# Patient Record
Sex: FEMALE | Race: WHITE
[De-identification: names, ages, dates, MRNs, and addresses within clinical notes are randomized per-mention and may not be internally consistent; named-entity substitution may affect disease eponyms.]

---

## 2020-08-24 ENCOUNTER — HOSPITAL ENCOUNTER (INPATIENT)
Dept: HOSPITAL 97 - 5TH | Age: 85
LOS: 11 days | Discharge: HOME | DRG: 554 | End: 2020-09-04
Attending: PSYCHIATRY & NEUROLOGY | Admitting: PSYCHIATRY & NEUROLOGY
Payer: COMMERCIAL

## 2020-08-24 VITALS — BODY MASS INDEX: 23.2 KG/M2

## 2020-08-24 DIAGNOSIS — M81.0: Primary | ICD-10-CM

## 2020-08-24 DIAGNOSIS — G89.29: ICD-10-CM

## 2020-08-24 DIAGNOSIS — R64: ICD-10-CM

## 2020-08-24 DIAGNOSIS — Z79.899: ICD-10-CM

## 2020-08-24 DIAGNOSIS — Z79.890: ICD-10-CM

## 2020-08-24 DIAGNOSIS — M19.90: ICD-10-CM

## 2020-08-24 DIAGNOSIS — M54.41: ICD-10-CM

## 2020-08-24 DIAGNOSIS — Z20.828: ICD-10-CM

## 2020-08-24 DIAGNOSIS — R53.1: ICD-10-CM

## 2020-08-24 LAB — UA COMPLETE W REFLEX CULTURE PNL UR: (no result)

## 2020-08-24 PROCEDURE — 97162 PT EVAL MOD COMPLEX 30 MIN: CPT

## 2020-08-24 PROCEDURE — 97116 GAIT TRAINING THERAPY: CPT

## 2020-08-24 PROCEDURE — 92523 SPEECH SOUND LANG COMPREHEN: CPT

## 2020-08-24 PROCEDURE — 83735 ASSAY OF MAGNESIUM: CPT

## 2020-08-24 PROCEDURE — 97110 THERAPEUTIC EXERCISES: CPT

## 2020-08-24 PROCEDURE — 80048 BASIC METABOLIC PNL TOTAL CA: CPT

## 2020-08-24 PROCEDURE — 97127: CPT

## 2020-08-24 PROCEDURE — 87086 URINE CULTURE/COLONY COUNT: CPT

## 2020-08-24 PROCEDURE — 97530 THERAPEUTIC ACTIVITIES: CPT

## 2020-08-24 PROCEDURE — 82040 ASSAY OF SERUM ALBUMIN: CPT

## 2020-08-24 PROCEDURE — 36415 COLL VENOUS BLD VENIPUNCTURE: CPT

## 2020-08-24 PROCEDURE — 87088 URINE BACTERIA CULTURE: CPT

## 2020-08-24 PROCEDURE — 84134 ASSAY OF PREALBUMIN: CPT

## 2020-08-24 PROCEDURE — 81001 URINALYSIS AUTO W/SCOPE: CPT

## 2020-08-24 PROCEDURE — 85025 COMPLETE CBC W/AUTO DIFF WBC: CPT

## 2020-08-24 RX ADMIN — SENNOSIDES AND DOCUSATE SODIUM PRN TAB: 8.6; 5 TABLET ORAL at 20:28

## 2020-08-24 RX ADMIN — MELATONIN PRN MG: 3 TAB ORAL at 20:28

## 2020-08-24 RX ADMIN — APIXABAN SCH MG: 2.5 TABLET, FILM COATED ORAL at 20:27

## 2020-08-24 NOTE — R.PREADM
PRE-ADMISSION SCREENING FORM



SCREENING DATE AND TIME



2020 09:38 (CDT) 



ANTICIPATED REHAB ADMISSION DATE



2020 



REFERRING FACILITY



[facility] 



REFERRAL DATE AND TIME



2020 09:38 (CDT) 



REFERRAL OFFICE PHONE



[phone] 



ACUTE ADMIT DATE



2020 





Previous Rehabilitation(s):





No.



REFERRING PHYSICIAN



ADOLFO LEONARD 



REHAB FACILITY



[facility] 



CLINICAL LIAISON



Jenniffer Leal 



PHYSICIAN REVIEWER



Dr. Edgar Dick M.D. 



MR#



[id number] 



[facility]



[id number] 



NAME



HENRIETTA MATIAS



ADDRESS



[address] [place] [address], [address] 



[facility] 



PHONE



[phone] 



[facility]



[id number] 



DATE OF BIRTH



1932 



AGE



88 



SSN#



[id number] 



GENDER



female 



MARITAL STATUS



 



RACE



unknown race 



ADMIT FROM



01 - Home (private home/apt. board/care, assisted living, group home, transitional living) 



PRE-HOSPITAL LIVING SETTING



01 - Home (private home/apt. board/care, assisted living, group home, transitional living) 



HOME TYPE AND DETAILS



Type of home: single family house

# of levels in the residence: 1

# of steps within the residence: 0

# of steps to enter the residence: 0



PRE-HOSPITAL LIVING WITH



Alone 



FAMILY SUPPORT



Yes 



PRIMARY FAMILY CONTACT NAME



CHICO MATIAS 



PRIMARY FAMILY CONTACT PHONE



[phone] 



PRIMARY FAMILY CONTACT RELATIONSHIP



Son 



PHONE PRIMARY FAMILY CONTACT ON ADM.?



no 



IS PRIMARY FAMILY CONTACT AUTH. REP.?



no 



1ST EMERGENCY CONTACT



CHICO MATIAS 



1ST CONTACT PHONE



[phone] 



1ST CONTACT RELATIONSHIP



Son 



PHONE 1ST CONTACT ON ADM.



no 



IS 1ST CONTACT AUTH. REP.?



no 



PHONE 2ND CONTACT ON ADM.?



no 



PATIENT EMPLOYMENT STATUS



Retired (for age) 



PATIENT EMPLOYER



No Employer 



PAYOR INFORMATION:



1ST PAYOR NAME



[employer] 



1ST PAYOR PHONE



[phone] 



1ST PAYOR POLICY ID



[id number] 



INJURY/ILLNESS DUE TO ACCIDENT?



No 



ANOTHER PARTY RESPONSIBLE?



No 



PRIMARY REHAB/ACUTE DIAGNOSIS:



OSTEOPEROSIS



ONSET DATE



2020



REHAB IMPAIRMENT CATEGORY (RICHARDSON):



20 Miscellaneous (Misc) does NOT meet 60% rule



PRIMARY DIAGNOSIS-RELATED SURGERIES:



No surgeries related to the primary diagnosis were performed.



SUMMARY OF ACUTE HOSPITALIZATION:



Pt. is a 87 yo Right-handed female of unknown race.

On 2020 she was admitted to [facility] with diagnosis OSTEOPEROSIS.

Her impairment category is Debility 16 -  Debility (16).

Pre-morbidly, Pt. was independent/mod-I in Safety Awareness, Social Cognition, Transfers Control, Sph
incter Control, and Endurance; and she had good Communication and Self-Care.

Currently, she has deficits of Self-Care, Locomotion, Safety Awareness, Balance, Transfers Control, S
ocial Cognition, and Sphincter Control.

Pt. is now referred to [facility] for acute in-patient rehabilitation in order
 to maximize patient's functional independence in activities of daily living, strength, ROM, and mobi
lity.

Patient has realistic goal of being discharged at assistance level 6-Rafaela to reside at Home with  Pt 
self.





MEDICATION ALLERGIES:



No Known Drug Allergies (NKDA)



ENVIRONMENTAL ALLERGIES:



- Substance Allergies

None Known

- Other Allergies

None Known



CODE STATUS:



Full code



WEIGHT/HEIGHT/BMI:



WEIGHT



140lbs 



HEIGHT



5' 6" 



BMI



22.6 



DIET:



- Diet Type

Regular

- Diet - Solid Texture

Regular

- Diet - Liquid Texture

Regular

- Tube Feed

N/A



REVIEW OF SYSTEMS:



- Gen

Oriented to: person, time, and place

- Vital Signs

Vital signs stable, afebrile

- CVS

RRR



MEDICATIONS/TREATMENT:



Other-  See attached MAR (Medication Administration Record).



CURRENT SPHINCTER CONTROL:



Pre-hospital bladder status: unspecified

# of bladder accidents in the last 7 days prior to screenin

Pre-hospital bowel status: unspecified

# of bowel accidents in the last 7 days prior to screenin

Last Bowel Movement Date: 2020



CURRENT LOCOMOTION STATUS:



distance walked 20 steps at a time with RW 



DETAILED CURRENT FUNCTIONAL STATUS:



- Bladder

accident frequency: Ind - No accidents in the past 7 days

- Bowel

accident frequency: Ind - No accidents in the past 7 days

- Walking

score based on distance walked: 0(N/A)

score based on distance walked: 1(<=50ft)

- Wheelchair

score based on distance traveled: 0(N/A)



QI SCORES:



- Self-Care

A. Eating  06-Independent  

B. Oral hygiene  04-Supervision or touching assistance  

C. Toileting hygiene  04-Supervision or touching assistance  

E. Shower/bathe self  03-Partial/moderate assistance  

F. Upper body dressing  03-Partial/moderate assistance  

G. Lower body dressing  03-Partial/moderate assistance  

H. Putting on/taking off footwear  03-Partial/moderate assistance  

- Mobility

A. Roll left and right  03-Partial/moderate assistance  

B. Sit to lying  03-Partial/moderate assistance  

C. Lying to sitting on side of bed  03-Partial/moderate assistance  

D. Sit to stand  03-Partial/moderate assistance  

E. Chair/bed-to-chair transfer  03-Partial/moderate assistance  

F. Toilet transfer  03-Partial/moderate assistance  

G. Car transfer  03-Partial/moderate assistance  

I. Walk 10 feet  03-Partial/moderate assistance  

J. Walk 50 feet with two turns  88-Not attempted due to medical condition or safety concerns  

K. Walk 150 feet  88-Not attempted due to medical condition or safety concerns  

L. Walking 10 feet on uneven surfaces  88-Not attempted due to medical condition or safety concerns  


M. 1 step (curb)  88-Not attempted due to medical condition or safety concerns  

N. 4 steps  88-Not attempted due to medical condition or safety concerns  

O. 12 steps  88-Not attempted due to medical condition or safety concerns  

P. Picking up object  88-Not attempted due to medical condition or safety concerns  

R. Wheel 50 feet with two turns  88-Not attempted due to medical condition or safety concerns  

S. Wheel 150 feet  88-Not attempted due to medical condition or safety concerns  

- Bladder and Bowel

Bladder continence  0-Always continent  

Bowel continence  0-Always continent  

- Endurance

Fair

- Balance

Fair

- Safety Awareness

Fair



CURRENT FUNC. DEFICITS:



Endurance, Balance, Mobility, Safety Awareness, and Self-Care



CURRENT / PREVIOUS ASSISTIVE DEVICES:



Rolling Walker

Shower Chair





HISTORY OF FALLS. HAS THE PATIENT HAD TWO OR MORE FALLS IN THE PAST YEAR OR ANY FALL WITH INJURY IN T
HE PAST YEAR?:



No 



PRIOR SURGERY. DID THE PATIENT HAVE MAJOR SURGERY DURING  DAYS PRIOR TO ADMISSION?:



No 



THERAPY NOTES FROM ACUTE CARE:



Attached.



SPECIAL NEEDS:



- Safety Concerns

Skin breakdown precautions needed due to skin breakdown risk



PATIENT NEEDS ACTIVE AND ONGOING THERAPEUTIC INTERVENTION OF MULTIPLE THERAPY DISCIPLINES, INCLUDING:




- Dietary and Nutrition

Adequate Nutrition. Nutritional Education. Nutritional Supplements. 



PATIENT NEEDS CLOSE MEDICAL SUPERVISION BY A REHABILITATION PHYSICIAN FOR:



Coordination of Treatment Team



PATIENT REQUIRES 24X7 REHAB NURSING FOR MEDICAL AND FUNCTIONAL MGT. OF THE FOLLOWING DEFICITS:



Disease Management

Medication Management

Patient/Family Education

Providing Safe Environment



PATIENT REQUIRES INTENSIVE, COORDINATED INTERDISCIPLINARY APPROACH TO REHAB:



Arranging Home Equipment/Services

Discharge Planning

Family Intervention/Training

/Case Management



PATIENT REHAB POTENTIAL:



URI MATIAS is able and expected to receive 3 hours of individualized therapy daily on at least 5 of mary
ry 7 days

URI Sanches prognosis for significant practical improvement within a reasonable period of time appears
 Good

Expected level of measurable improvement will be of a practical value to URI MATIAS's functional capaci
ty or adaptations to impairments

Has a viable Discharge Plan

Medically appropriate; condition is sufficiently stable to participate in intensive rehab program



DISCHARGE PLAN:



- Estimated Length of Stay (days)

13. 



- Consensus on plan

Discharge plan has been discussed with primary caregiver. Patient/Family is in agreement with the jo
n. Primary caregiver is in agreement with the plan. 



- Patient/Family Goals

Return home independently. 



- Planned Living Setting Upon Discharge

Home, to live alone. Transitional Living. Primary caregiver: Pt self. 



RECOMMENDED CARE LEVEL:



IRF



RECOMMENDATION DETAILS:



Recommended Admission to Comprehensive Rehabilitation Program to Increase Functional [place]



SCREENER'S COMPLETENESS CONFIRMATION:



- Screening Confirmation

The patient data collection on this preadmission screening form is finished



PHYSICIANS REVIEW AND ADMISSION DETERMINATION



Admit - Based on my review of the Pre-Admission Screening results, in my medical judgment and experie
nce, I concur with the findings and recommend admission to [facility], as this
 patient requires an IRF level of care.



SIGNATURE PANEL:



 - [electronically] signed by Jenniffer Leal on 2020 at 09:40 (CDT)

 - [electronically] signed by Pratik Horne PT on 2020 at 10:11 (CDT)

Physician Reviewer - [electronically] signed by Dr. Edgar Dick M.D. on 2020 at 10:48 (CDT
)

## 2020-08-24 NOTE — XMS REPORT
Continuity of Care Document

                           Created on:2020



Patient:HENRIETTA MATIAS

Sex:Female

:1932

External Reference #:[id number]





Demographics







                          Address                   [address]



                                                    [place] [address]

 

                          Home Phone                [phone]

 

                          Work Phone                [phone]

 

                          Mobile Phone              [phone]

 

                          Email Address             [email or web address]

 

                          Preferred Language        English

 

                          Marital Status            Unknown

 

                          Scientology Affiliation     Unknown

 

                          Race                      Unknown

 

                          Additional Race(s)        Unavailable



                                                    White

 

                          Ethnic Group              Not  or 









Author







                          Organization              [facility]

t

 

                          Address                   [address]



                                                    [place] [address]

 

                          Phone                     [phone]









Support







                Name            Relationship    Address         Phone

 

                BRENDA           Unavailable     [address] [phone]



                                                [place] [address] 

 

                BRENDA           Unavailable     [address] [phone]



                                                [place] [address] 

 

                Contact         Unavailable     [address] [phone]



                                                [place] [address] 

 

                Brenda           Child           Unavailable     [phone]









Care Team Providers







                    Name                Role                Phone

 

                    Mikey NOVAK              Attending Clinician [phone]

 

                    FRANCI WYATT            Attending Clinician Unavailable

 

                    SEES                Attending Clinician Unavailable

 

                    DEVAN GERONIMO           Attending Clinician Unavailable

 

                    ROSAS SHULTZ          Attending Clinician Unavailable

 

                    TRACY                Attending Clinician Unavailable

 

                    FRANCI WYATT            Admitting Clinician Unavailable

 

                    SEES                Admitting Clinician Unavailable

 

                    DEVAN              Admitting Clinician Unavailable

 

                    DINORAH               Admitting Clinician Unavailable

 

                    ROSAS SHULTZ          Admitting Clinician Unavailable

 

                    TRACY                Admitting Clinician Unavailable









Payers







           Payer Name Policy Type Policy Number Effective Date Expiration Date S

ource







Problems







       Condition Condition Condition Status Onset  Resolution Last   Treating Co

mments 

Source



       Name   Details Category        Date   Date   Treatment Clinician        



                                                 Date                 

 

       Hypothyroi Hypothyroi Problem Active                                    [facility]

[facility]



       dism   dism                                                    [facility]



                                                                      [facility]



                                                                      (LUF/LI



                                                                      V/SA)

 

       L3     L3     Problem Active                                    [facility]



       FRACTURE FRACTURE                                                  [facility]



                                                                      [facility]



                                                                      (LUF/LI



                                                                      V/SA)

 

       Hyperchole Hyperchole Problem Active                                    [facility]

[facility]



       sterolemia sterolemia                                                  [facility]



                                                                      [facility]



                                                                      (LUF/LI



                                                                      V/SA)

 

       Backache Backache Problem Active                                    [facility]

[facility]



                                                                      [facility]



                                                                      [facility]



                                                                      (LUF/LI



                                                                      V/SA)

 

       Arthritis Arthritis Problem Active                                    [facility]



                                                                      [facility]



                                                                      (LUF/LI



                                                                      V/SA)

 

       Vertigo Vertigo Problem Active                                    [facility]



                                                                      [facility]



                                                                      [facility]



                                                                      (LUF/LI



                                                                      V/SA)







Allergies, Adverse Reactions, Alerts







       Allergy Allergy Status Severity Reaction(s) Onset  Inactive Treating Comm

ents 

Source



       Name   Type                        Date   Date   Clinician        

 

       No Known DA     Active U                                   [facility]



       Contrast                                                     [place]



       Allergie                             00:00:                      [address]

 

       No Known DA     Active U                                   [facility]



       Drug                                                       [place]



       Allergie                             00:00:                      [address]

 

       No Known DA     Active U                                   [facility]



       Food                                                       [place]



       Allergie                             00:00:                      [address]

 

       No Known DA     Active U                                   [facility]



       Other                                                      [place]



       Allergie                             00:00:                      [address]







Social History







                Smoking Status  Start Date      Stop Date       Source

 

                Never smoker                                    [facility] (LUF/PATRICK/SA)







Medications







       Ordered Filled Start  Stop   Current Ordering Indication Dosage Frequency

 Signature

                    Comments            Components          Source



     Medication Medication Date Date Medication? Clinician                (SIG) 

          



     Name Name                                                   

 

     atorvastati atorvastati           Yes            20mg                     [facility]

[facility]



     n 20 MG n 20 MG                                                   [facility]



     Oral Tablet Oral Tablet                                                   [facility]

[facility]



                                                                 l



                                                                 (LUF/LI



                                                                 V/SA)

 

     Levothyroxi Levothyroxi           Yes            37.5mcg QD                

  [facility]



     ne Oral ne Oral                                                   [facility]



                                                                 [facility]



                                                                 (LUF/LI



                                                                 V/SA)

 

     MAGNESIUM MAGNESIUM           Yes            30mg QD                  [facility]

[facility]



     GLUCONATE GLUCONATE                                                   [facility]



     550 MG Oral 550 MG Oral                                                   [facility]

[facility]



     Tablet Tablet                                                   l



                                                                 (LUF/LI



                                                                 V/SA)

 

     Meclizine Meclizine           Yes       vertigo 25mg                     [facility]

[facility]



     Hydrochlori Hydrochlori                                                   [facility]

[facility]



     de 25 MG de 25 MG                                                   [facility]



     Oral Tablet Oral Tablet                                                   l



                                                                 (LUF/LI



                                                                 V/SA)

 

     Multivitami Multivitami           Yes            1tab-ca QD                

  [facility]



     ns   ns                            p                        [facility]



                                                                 [facility]



                                                                 (LUF/LI



                                                                 V/SA)







Immunizations







           Ordered Immunization Filled Immunization Date       Status     Commen

ts   Source



           Name       Name                                        

 

           FLU SHOT   FLU SHOT   2014-10-10 Completed             [facility]



                                 [facility]



                                                                  (LUF/PATRICK/SA)

 

           PNEUMOVAX  PNEUMOVAX  2000-10-10 Completed             [facility]



                                 [facility]



                                                                  (F/PATRICK/SA)







Procedures

This patient has no known procedures.



Encounters







        Start   End     Encounter Admission Attending Care    Care    Encounter 

Source



        Date/Time Date/Time Type    Type    Clinicians Facility Department ID   

   

 

        2020 Telemedici         Mikey    [facility]    [id number]

[id number] 



        08:00:31 08:20:31 ne Visit         Reuben Rodriguez [id number]        

 



                                                [place] [id number]         



                                                [place] [phone]         



                                                nal     9             



                                                [facility] [address]                 

 

        2020 Office          Mikey    [facility]    [id number]

16 



        10:32:48 11:47:08 Visit           Reuben Rodriguez [id number]         



                                                [place] [id number]         



                                                [place] [phone]         



                                                nal     9             



                                                [facility] [address]                 

 

        2019 PAIN IN 3       [place], [facility]     [id number]

 [facility]



        15:57:00 23:59:00 RIGHT FOOT         MONA [facility]       

  Boy -



                                                [address]                 [facility]



                                                [address]                  l



                                                [address],                 (LUF/LI



                                                [place]                 V/SA)



                                                [place], [place]                   



                                                [address]                   

 

        2018 OCCLUSION 3       SEES, PARVIN [facility]     [id number]

[id number] [facility]



        13:21:00 23:59:00 & STENOS                 [facility] -



                        ABI                     [address]                 [facility]



                        [address]                 [address]                  [address]



                        ART                     [place],                 (LUF/LI



                                                [place]                 V/SA)



                                                [place], [place]                   



                                                [address]                   

 

        2017 ENC SCR 3       [place],  [facility]     [id number]

 [facility]



        10:41:00 23:59:00 MAMMO           LACONIA [facility]         L

[facility] -



                        MALIG                   [address]                 [facility]



                        [address]M                 [address]                  l



                        BREAST                  [place],                 (LUF/LI



                                                [place]                 V/SA)



                                                [place], [place]                   



                                                [address]                   







Results







           Test Description Test Time  Test Comments Results    Result     Sourc

e



                                                       Comments   

 

           HISTOLOGY  2019                                  



           [place] 12:21:00              [address] [place] [address]            



                                            [address]Ldypd:            



                                            [phone]       Fax:            



                                            [phone] #:            



                                            [id number]   Medical            



                                            Director: Parvin Barker M.D.Surgical            



                                            Pathology Consultation            



                                            ReportPatient Name:            



                                            HENRIETTA MATIAS            



                                            Case #: [id number] Med.            



                                            Rec.                  



                                            #:[id number]:            



                                            PATRICK-PATRICK Surgery Date:            



                                            2019 :            



                                            1932 (Age:            



                                            87)Account #:[id number]            



                                            Received: 2019            



                                            Gender:  F Copy to :            



                                            Reported:             



                                            2019Physician(s):            



                                            Justin Steward            



                                            Specimen(s) ReceivedA:            



                                            Femoral head, right,            



                                            fracture Final            



                                            Pathologic            



                                            DiagnosisBone, right            



                                            femoral head:-            



                                            changes compatible with            



                                            fracture SITE and            



                                            osteoporosis,            



                                            noevidencemalignancy.            



                                                ***Electronically            



                                            Signed Out***            



                                            /2019 Anu Parra MD, Board            



                                            Certified in Anatomic            



                                            Pathology Clinical            



                                            HistoryRight hip            



                                            fracture.Gross            



                                            DescriptionSpecimen            



                                            labeled right femoral            



                                            head consists of a            



                                            femoral head            



                                            withportion offemoral            



                                            neck measuring 7.5 x            



                                            4.5 x 4.5 cm.  The            



                                            articular surface            



                                            showsareasof            



                                            eburnation.  The            



                                            trabecular bone is            



                                            hemorrhagic.            



                                            Additionally in            



                                            thecontainer, there are            



                                            two fragments of bone            



                                            measuring 4 x 1.5 x 1            



                                            and 4 x2.5 x1.5 cm.            



                                            Representative sections            



                                            are submitted or            



                                            permanent sections            



                                            inacassette labeled A1            



                                            after decalcification.            



                                            /2019 Anu Parra MD, Board            



                                            Certified in Anatomic            



                                            Pathology  Microscopic            



                                            DescriptionBone with            



                                            thin                  



                                            fragmented/necrotic            



                                            trabeculae with            



                                            intertrabecularhemorrha            



                                            ge.The surface            



                                            cartilage shows            



                                            degenerative changes            



                                            with fibrillation            



                                            ofthesurface and            



                                            clonation of            



                                            chondrocytes.  There is            



                                            no evidence            



                                            malignancy.Billing Fee            



                                            Code(s): A; 71696,            



                                            50833                 

 

           CULTURE, URINE 2019            Specimen: Urine            



                      07:36:00              SpecimensCollected:            



                                            2019 09:38            



                                            Status: Final      Last            



                                            Updated: 2019            



                                            07:36          Culture            



                                            Result (Final) (Final)            



                                              >100,000 cc/mL Gram            



                                            Negative Bacilli            



                                            Isolate (Final) (C)            



                                            (Final)               



                                            ***Escherichia coli            



                                            ESBL***    Strains of            



                                            Klebsiella spp. and            



                                            Escherichia coli may be            



                                            clinically    resistant            



                                            to cephalosporin and            



                                            aztreonam therapy by            



                                            virtue of extended-            



                                            spectrum B-lactamase            



                                            (ESBL) production,            



                                            despite apparent in            



                                            vitro    susceptibility            



                                            to some of these            



                                            agents.   Multi-drug            



                                            resistantorganism            



                                            isolated.             



                                            Recommendation------Iso            



                                            lation protocol.            



                                            Amikacin           <=2            



                                                 S      Ampicillin            



                                                   >=32      R            



                                            Ampicillin/Sulbac  >=32            



                                                 R      Cefazolin            



                                                   >=64      R            



                                            Cefepime           <=1            



                                                 S      Cefoxitin            



                                                   8         S            



                                            Ceftazidime        <=1            



                                                 S      Ceftriaxone            



                                                   <=1       S            



                                            Ciprofloxacin            



                                            <=0.25    S            



                                            Gentamicin         <=1            



                                                 S                



                                            Levofloxacin            



                                            <=0.12    S            



                                            Meropenem             



                                            <=0.25    S            



                                            Nitrofurantoin     128            



                                                 R                



                                            Piperacillin/Tazo  <=4            



                                                 S      Tobramycin            



                                                   <=1       S            



                                            Trimeth/Sulfa      <=20            



                                                 S      ESBL            



                                                   Positive  +            

 

           XR HIP COMP 2-3 2019            Right hip 2            



           views      16:45:16              views:History: Right            



                                            hip painAP and frog-leg            



                                            lateral views of the            



                                            right hip were            



                                            obtained. Comparison is            



                                            madeto 19. A right            



                                            hip replacement is            



                                            again noted. The            



                                            hardware is intact            



                                            andis unchanged in            



                                            position. No new            



                                            findings are noted. The            



                                            lesser trochanter            



                                            againprojects is a            



                                            separate fragment            



                                            medial to the            



                                            prosthesis as on the            



                                            prior study.There is            



                                            some residual            



                                            postoperative air in            



                                            the soft tissues about            



                                            the hip.Impression:            



                                            Stable changes of            



                                            recent right hip            



                                            arthroplasty.This final            



                                            report was            



                                            electronically signed            



                                            by Dr Lokesh Esposito MD [id number]            



                                            PMDictated By: LOKESH ESPOSITODate:            



                                            2019 16:38            









                    RBC, Leukoreduced   2019 12:19:00 









                      Test Item  Value      Reference Range Interpretation Comme

nts









             RBC Unit (test code = RBCUNIT) Released for Transfusion            

               



CROSSMATCH x  11:58:00





             Test Item    Value        Reference Range Interpretation Comments

 

             Crossmatch (test code = Completed: Compatible                      

     



             XMATCH)                                             



TYPE &amp; [id number] 07:24:00





             Test Item    Value        Reference Range Interpretation Comments

 

             ABO Blood Type (test code = ABO) A                                 

     

 

             Rh (test code = RH) Positive                               

 

             Antibody Screen (test code = ABSCR) Negative     Negative     N    

        



[id number] 05:44:00





             Test Item    Value        Reference Range Interpretation Comments

 

             Glucose (test code 111 mg/dl           H            



             = GLU)                                              

 

             BUN (test code = 14.0 mg/dl   6.0-17.0                  



             BUN)                                                

 

             Creatinine (test 0.6 mg/dl    0.4-1.2                   



             code = CREA)                                        

 

             Sodium (test code = 131 mmol/l   137-145      L            



             NA)                                                 

 

             Potassium (test 4.5 mmol/l   3.5-5.0                   



             code = K)                                           

 

             Chloride (test code 100 mmol/l                       



             = CL)                                               

 

             CO2 (test code = 25 mmol/l    22-30                     



             CO2)                                                

 

             Calcium (test code 8.1 mg/dl    8.4-10.2     L            



             = CALC)                                             

 

             EGFR if  >60                                    



             American (test code mL/min/1.73m\                           



             = EGFRAA)    S\2                                    

 

             EGFR if Non- >60                                    Estimate

d Glomerular



             American (test code mL/min/1.73m\                           Filtrat

ion Rate (eGFR)



             = EGFRNA)    S\2                                    Reference Inter

vals



                                                                 Decision Points

 for 18



                                                                 years and older

 and



                                                                 average body ma

ss:



                                                                  >=  60



                                                                    Does not exc

lude



                                                                 kidney disease.

 30 -



                                                                 59



                                                                 Suggests modera

te



                                                                 chronic kidney 

disease



                                                                 and



                                                                         indicat

es the



                                                                 need for furthe

r



                                                                 investigation



                                                                 



                                                                 including asses

sment



                                                                 of proteinuria 

and



                                                                 



                                                                 



                                                                 cardiovascular



                                                                 factors. < 30



                                                                      Usually in

dicates



                                                                 a need for refe

rral



                                                                 for assessment



                                                                                

    and



                                                                 management of c

hronic



                                                                 kidney failure.



CBC WITH AUTO [id number] 05:40:00





             Test Item    Value        Reference Range Interpretation Comments

 

             WBC (test code = WBC) 10.32 10\S\3/ul 4.80-10.80                

 

             RBC (test code = RBC) 2.52 10\S\6/ul 4.20-5.40    L            

 

             Hemoglobin (test code = HGB) 7.8 gm/dl    12.0-14.0    L           

 

 

             Hematocrit (test code = HCT) 23.2 %       37.0-47.0    L           

 

 

             MCV (test code = MCV) 92.1 fL      81.0-99.0                 

 

             MCH (test code = MCH) 31.0 pg      27.0-31.0                 

 

             MCHC (test code = MCHC) 33.6 gm/dl   33.0-37.0                 

 

             RDW (test code = RDWVC) 13.9 %       11.5-14.5                 

 

             Platelet (test code = PLT) 221 10\S\3/ul 130-400                   

 

             MPV (test code = MPV) 9.3 fL       7.4-10.4     A            

 

             NE% (test code = NE) 68.9 %       42.0-75.0                 

 

             LY% (test code = LY) 18.3 %       13.0-42.0                 

 

             MO% (test code = MO) 10.5 %       4.0-14.0                  

 

             EO% (test code = EO) 1.2 %        1.0-5.0                   

 

             BA% (test code = BA) 0.5 %        0.0-3.0                   

 

             IG% (test code = IG%) 0.6 %        0.0-0.4      H            



[id number] 05:39:00





             Test Item    Value        Reference Range Interpretation Comments

 

             Glucose (test code 141 mg/dl           H            



             = GLU)                                              

 

             BUN (test code = 10.0 mg/dl   6.0-17.0                  



             BUN)                                                

 

             Creatinine (test 0.5 mg/dl    0.4-1.2                   



             code = CREA)                                        

 

             Sodium (test code = 132 mmol/l   137-145      L            



             NA)                                                 

 

             Potassium (test 3.7 mmol/l   3.5-5.0                   



             code = K)                                           

 

             Chloride (test code 100 mmol/l                       



             = CL)                                               

 

             CO2 (test code = 26 mmol/l    22-30                     



             CO2)                                                

 

             Calcium (test code 7.9 mg/dl    8.4-10.2     L            



             = CALC)                                             

 

             EGFR if  >60                                    



             American (test code mL/min/1.73m\                           



             = EGFRAA)    S\2                                    

 

             EGFR if Non- >60                                    Estimate

d Glomerular



             American (test code mL/min/1.73m\                           Filtrat

ion Rate (eGFR)



             = EGFRNA)    S\2                                    Reference Inter

vals



                                                                 Decision Points

 for 18



                                                                 years and older

 and



                                                                 average body ma

ss:



                                                                  >=  60



                                                                    Does not exc

lude



                                                                 kidney disease.

 30 -



                                                                 59



                                                                 Suggests modera

te



                                                                 chronic kidney 

disease



                                                                 and



                                                                         indicat

es the



                                                                 need for furthe

r



                                                                 investigation



                                                                 



                                                                 including asses

sment



                                                                 of proteinuria 

and



                                                                 



                                                                 



                                                                 cardiovascular



                                                                 factors. < 30



                                                                      Usually in

dicates



                                                                 a need for refe

rral



                                                                 for assessment



                                                                                

    and



                                                                 management of c

hronic



                                                                 kidney failure.



CBC WITH AUTO [id number] 05:20:00





             Test Item    Value        Reference Range Interpretation Comments

 

             WBC (test code = 13.64        4.80-10.80   H            



             WBC)         10\S\3/ul                              

 

             RBC (test code = 2.83 10\S\6/ul 4.20-5.40    L            



             RBC)                                                

 

             Hemoglobin (test 8.9 gm/dl    12.0-14.0    L            



             code = HGB)                                         

 

             Hematocrit (test 26.8 %       37.0-47.0    L            



             code = HCT)                                         

 

             MCV (test code = 94.7 fL      81.0-99.0                 



             MCV)                                                

 

             MCH (test code = 31.4 pg      27.0-31.0    H            



             MCH)                                                

 

             MCHC (test code = 33.2 gm/dl   33.0-37.0                 



             MCHC)                                               

 

             RDW (test code = 13.6 %       11.5-14.5                 



             RDWVC)                                              

 

             Platelet (test code 243 10\S\3/ul 130-400                   



             = PLT)                                              

 

             MPV (test code = 9.1 fL       7.4-10.4     A            "NOT MEASUR

ED"



             MPV)                                                RESULTS ARE DIS

PLAYED



                                                                 WHEN THE INSTRU

MENT



                                                                 HAS A SUPPRESSE

D OR



                                                                 UNREPORTABLE RE

SULT.



                                                                 THIS WILL MOST 

OFTEN



                                                                 HAPPEN WITH THE

 [facility]



                                                                 WHEN THERE IS A

N



                                                                 ABNORMAL PLATEL

ET



                                                                 DISTRIBUTION DU

E TO A



                                                                 CRITICAL LOW VA

LUE OR



                                                                 PLATELET CLUMPI

NG.



                                                                 THE RDW MAY BE



                                                                 SUPPRESSED IF T

HERE



                                                                 ARE MULTIPLE PE

AKS



                                                                 PRESENT ON THE 

RBC



                                                                 HISTOGRAM.  IN 

THIS



                                                                 CASE, A MANUAL 

REVIEW



                                                                 OF THE SLIDE WI

LL BE



                                                                 PERFORMED, AND 

RBC



                                                                 MORPHOLOGY WILL

 BE



                                                                 NOTED ON THE RE

PORT.

 

             NE% (test code = 70.3 %       42.0-75.0                 



             NE)                                                 

 

             LY% (test code = 17.7 %       13.0-42.0                 



             LY)                                                 

 

             MO% (test code = 10.6 %       4.0-14.0                  



             MO)                                                 

 

             EO% (test code = 0.2 %        1.0-5.0      L            



             EO)                                                 

 

             BA% (test code = 0.4 %        0.0-3.0                   



             BA)                                                 

 

             IG% (test code = 0.8 %        0.0-0.4      H            



             IG%)                                                



[id number] 05:24:00





             Test Item    Value        Reference Range Interpretation Comments

 

             Glucose (test code 111 mg/dl           H            



             = GLU)                                              

 

             BUN (test code = 10.0 mg/dl   6.0-17.0                  



             BUN)                                                

 

             Creatinine (test 0.6 mg/dl    0.4-1.2                   



             code = CREA)                                        

 

             Sodium (test code = 134 mmol/l   137-145      L            



             NA)                                                 

 

             Potassium (test 4.4 mmol/l   3.5-5.0                   



             code = K)                                           

 

             Chloride (test code 102 mmol/l                       



             = CL)                                               

 

             CO2 (test code = 28 mmol/l    22-30                     



             CO2)                                                

 

             Calcium (test code 7.7 mg/dl    8.4-10.2     L            



             = CALC)                                             

 

             EGFR if  >60                                    



             American (test code mL/min/1.73m\                           



             = EGFRAA)    S\2                                    

 

             EGFR if Non- >60                                    Estimate

d Glomerular



             American (test code mL/min/1.73m\                           Filtrat

ion Rate (eGFR)



             = EGFRNA)    S\2                                    Reference Inter

vals



                                                                 Decision Points

 for 18



                                                                 years and older

 and



                                                                 average body ma

ss:



                                                                  >=  60



                                                                    Does not exc

lude



                                                                 kidney disease.

 30 -



                                                                 59



                                                                 Suggests modera

te



                                                                 chronic kidney 

disease



                                                                 and



                                                                         indicat

es the



                                                                 need for furthe

r



                                                                 investigation



                                                                 



                                                                 including asses

sment



                                                                 of proteinuria 

and



                                                                 



                                                                 



                                                                 cardiovascular



                                                                 factors. < 30



                                                                      Usually in

dicates



                                                                 a need for refe

rral



                                                                 for assessment



                                                                                

    and



                                                                 management of c

hronic



                                                                 kidney failure.



CBC WITH AUTO [id number] 05:07:00





             Test Item    Value        Reference Range Interpretation Comments

 

             WBC (test code = 7.73 10\S\3/ul 4.80-10.80                



             WBC)                                                

 

             RBC (test code = 2.89 10\S\6/ul 4.20-5.40    L            



             RBC)                                                

 

             Hemoglobin (test 8.9 gm/dl    12.0-14.0    L            



             code = HGB)                                         

 

             Hematocrit (test 27.2 %       37.0-47.0    L            



             code = HCT)                                         

 

             MCV (test code = 94.1 fL      81.0-99.0                 



             MCV)                                                

 

             MCH (test code = 30.8 pg      27.0-31.0                 



             MCH)                                                

 

             MCHC (test code = 32.7 gm/dl   33.0-37.0    L            



             MCHC)                                               

 

             RDW (test code = 13.5 %       11.5-14.5                 



             RDWVC)                                              

 

             Platelet (test code 214 10\S\3/ul 130-400                   



             = PLT)                                              

 

             MPV (test code = 9.1 fL       7.4-10.4     A            "NOT MEASUR

ED"



             MPV)                                                RESULTS ARE DIS

PLAYED



                                                                 WHEN THE INSTRU

MENT



                                                                 HAS A SUPPRESSE

D OR



                                                                 UNREPORTABLE RE

SULT.



                                                                 THIS WILL MOST 

OFTEN



                                                                 HAPPEN WITH THE

 [facility]



                                                                 WHEN THERE IS A

N



                                                                 ABNORMAL PLATEL

ET



                                                                 DISTRIBUTION DU

E TO A



                                                                 CRITICAL LOW [facility]

LUE OR



                                                                 PLATELET CLUMPI

NG.



                                                                 THE RDW MAY BE



                                                                 SUPPRESSED IF T

HERE



                                                                 ARE MULTIPLE PE

AKS



                                                                 PRESENT ON THE 

RBC



                                                                 HISTOGRAM.  IN 

THIS



                                                                 CASE, A MANUAL 

REVIEW



                                                                 OF THE SLIDE WI

LL BE



                                                                 PERFORMED, AND 

RBC



                                                                 MORPHOLOGY WILL

 BE



                                                                 NOTED ON THE RE

PORT.

 

             NE% (test code = 59.7 %       42.0-75.0                 



             NE)                                                 

 

             LY% (test code = 26.1 %       13.0-42.0                 



             LY)                                                 

 

             MO% (test code = 12.8 %       4.0-14.0                  



             MO)                                                 

 

             EO% (test code = 0.4 %        1.0-5.0      L            



             EO)                                                 

 

             BA% (test code = 0.6 %        0.0-3.0                   



             BA)                                                 

 

             IG% (test code = 0.4 %        0.0-0.4                   



             IG%)                                                



XR HIP [id number] 18:25:40Right hip 2 - viewsHISTORY: Fracture.COMPARISON: 
2019 at 11:07 AM.FINDINGS:Intraoperative film demonstrating right 
hip replacement and ORIF ofintertrochanteric fracture in progress. Overlying 
artifact. Vascularcalcifications. Remote fractures of the left superior inferior
pubic rami.IMPRESSION:Intraoperative film demonstrating right hip replacement 
and ORIF ofintertrochanteric fracture in progress.This final report was 
electronically signed by Dr Aayush Mejia MD 20196:19 PMDictated By: 
Antony MEJIAte:  2019 18:19XR HIP COMP 2-3  [id number] 16:46:16To 
be done STAT in [facility] femur 2 - viewsHISTORY:  Postoperative evaluation of 
fracture repair.COMPARISON: 2019 at 8:15 AM.FINDINGS:Status post 
total right hip replacement with a prosthesis in adequate position.There is also
a fracture fixation with cerclage wires and curved metallic plateand screw 
constructalong the greater trochanter. Hardware appears in adequateposition. 
Postoperative soft tissue swelling and gas. Remote fractures of leftsuperior and
inferior rami.IMPRESSION:Status post total right hipreplacement with a 
prosthesis in adequate position.Status post ORIF with cerclage wires and curved 
metallic plate and screwconstruct along the greater trochanterThis final report 
was electronically signed by Dr Aayush Mejia MD 20194:39 PMDictated By: 
Edmar MEJIA:  2019 16:39URINALYSIS WITH [id number] 
10:23:00





             Test Item    Value        Reference Range Interpretation Comments

 

             Color (test code = UCOLR) Lt. Yellow                             

 

             Clarity (test code = UCLAR) Clear                                  

 

             Glucose (test code = UGLUC) NEGATIVE     NEGATIVE     N            

 

             Bilirubin (test code = UBILI) NEGATIVE     NEGATIVE     N          

  

 

             Ketones (test code = UKET) NEGATIVE     NEGATIVE     N            

 

             Specific Gravity (test code = 1.010        1.005-1.030  A          

  



             USPGR)                                              

 

             Blood (test code = UBLD) Trace-Intact NEGATIVE     A            

 

             PH (test code = UPH) 7.5          4.5-8.0      A            

 

             Protein (test code = UPROT) NEGATIVE     NEGATIVE     N            

 

             Urobilinogen (test code = U 0.2          >0.2         N            



             UROB)                                               

 

             Nitrite (test code = UNITR) Positive     NEGATIVE     A            

 

             Leukocyte Esterase (test code = Moderate     NEGATIVE     A        

    



             ULEUK)                                              

 

             WBC (test code = WBCUR) 30-40        0-5          A            

 

             RBC (test code = RBCUR) 0-5          0-5          N            

 

             Epithial Cells (test code = U 0-1          0-10         A          

  



             EPI)                                                

 

             Bacteria (test code = UBACT) 2+           None Seen,Trace A        

    



TROPONIN-I [id number] 09:16:00





             Test Item    Value        Reference Range Interpretation Comments

 

             Troponin-I (test <0.015 ng/ml 0.000-0.034  N            The [address] Pe

rcentile URL



             code = TROP)                                        is 0.045 ng/mL 

for the



                                                                 [employer] T

roponin I.



                                                                  The Joint Euro

pean



                                                                 Society of



                                                                 Cardiology/[employer]

[facility]



                                                                 College of Card

iology



                                                                 (ESC/ACC) and t

he



                                                                 National Academ

y of



                                                                 Clinical Bioche

radha



                                                                 Standards of [place]

boratory



                                                                 Practices (NACB

)



                                                                 recommends that

 the



                                                                 diagnosis of AM

I includes



                                                                 the presence of

 clinical



                                                                 history suggest

jean of



                                                                 Acute Coronary 

Syndrome



                                                                 (ACS) and a max

imum



                                                                 concentration o

f cardiac



                                                                 troponin exceed

ing the



                                                                 99th percentile

 of a



                                                                 normal referenc

e



                                                                 population [upp

er



                                                                 reference limit

 (URL)] on



                                                                 at least one oc

casion



                                                                 during the firs

t 24 hours



                                                                 after the clini

mitchell event.



                                                                 



PRO-BNP(B-Type Natriuretic Peptide)2019 09:16:00





             Test Item    Value        Reference Range Interpretation Comments

 

             Pro-BNP(B-Peptide) (test code = 141 pg/ml    0-450                 

    



             PROBNP)                                             



PT AND [id number] 09:15:00





             Test Item    Value        Reference Range Interpretation Comments

 

             Protime (test code 10.7 seconds 9.0-11.8                  



             = PT)                                               

 

             INR (test code = 1.0          0.9-1.1                   INR results

 are



             INR)                                                intended ONLY t

o



                                                                 monitor Oral



                                                                 Anticoagulant t

herapy



                                                                 in stablized pa

tients.



                                                                 The INR Therape

utic



                                                                 Range is 2.0 - 

3.0



                                                                 Patients with a



                                                                 mechanical hear

t, the



                                                                 INR Range is 2.

5 - 3.5



[id number] 09:15:00





             Test Item    Value        Reference Range Interpretation Comments

 

             aPTT (test code = PTT) 26.3 seconds 25.3-35.7                 



[id number] 09:14:00





             Test Item    Value        Reference Range Interpretation Comments

 

             Glucose (test code 115 mg/dl           H            



             = GLU)                                              

 

             BUN (test code = 15.0 mg/dl   6.0-17.0                  



             BUN)                                                

 

             Creatinine (test 0.7 mg/dl    0.4-1.2                   



             code = CREA)                                        

 

             Sodium (test code = 132 mmol/l   137-145      L            



             NA)                                                 

 

             Potassium (test 4.2 mmol/l   3.5-5.0                   



             code = K)                                           

 

             Chloride (test code 99 mmol/l                        



             = CL)                                               

 

             CO2 (test code = 28 mmol/l    22-30                     



             CO2)                                                

 

             Calcium (test code 8.4 mg/dl    8.4-10.2                  



             = CALC)                                             

 

             T Protein (test 8.2 gm/dl    5.1-8.7                   



             code = TP)                                          

 

             Albumin (test code 3.7 gm/dl    3.5-4.6                   



             = ALB)                                              

 

             A/G Ratio (test 0.8 %        1.1-2.2      L            



             code = AGRAT)                                        

 

             AST (SGOT) (test 36 U/L       11-36                     



             code = AST)                                         

 

             ALT (SGPT) (test 16 U/L       11-40                     



             code = ALT)                                         

 

             Alkaline Phos (test 51 U/L                           



             code = ALKP)                                        

 

             Total Bilirubin 0.5 mg/dl    0.2-1.2                   



             (test code = TBIL)                                        

 

             Globulin (test code 4.5 gm/dl    2.3-3.5      H            



             = GLOBU)                                            

 

             Calcium, Corrected 8.6 mg/dl    8.4-10.2                  Various f

ormulas exist



             (test code =                                        for corrected s

nette



             CALCCORR)                                           calcium results

, each



                                                                 yielding differ

ent



                                                                 values.  This



                                                                 corrected resul

t was



                                                                 based on the fo

rmula:



                                                                 Corrected Calci

um =



                                                                 SerumCalcium + 

[0.8 *



                                                                 ( 4 - SerumAlbu

min)]

 

             EGFR if  >60                                    



             American (test code mL/min/1.73m\                           



             = EGFRAA)    S\2                                    

 

             EGFR if Non- >60                                    Estimate

d Glomerular



             American (test code mL/min/1.73m\                           Filtrat

ion Rate (eGFR)



             = EGFRNA)    S\2                                    Reference Inter

vals



                                                                 Decision Points

 for 18



                                                                 years and older

 and



                                                                 average body ma

ss:



                                                                  >=  60



                                                                    Does not exc

lude



                                                                 kidney disease.

 30 -



                                                                 59



                                                                 Suggests modera

te



                                                                 chronic kidney 

disease



                                                                 and



                                                                         indicat

es the



                                                                 need for furthe

r



                                                                 investigation



                                                                 



                                                                 including asses

sment



                                                                 of proteinuria 

and



                                                                 



                                                                 



                                                                 cardiovascular



                                                                 factors. < 30



                                                                      Usually in

dicates



                                                                 a need for refe

rral



                                                                 for assessment



                                                                                

    and



                                                                 management of c

hronic



                                                                 kidney failure.



CBC WITH AUTO [id number] 09:05:00





             Test Item    Value        Reference Range Interpretation Comments

 

             WBC (test code = WBC) 12.90 10\S\3/ul 4.80-10.80   H            

 

             RBC (test code = RBC) 4.00 10\S\6/ul 4.20-5.40    L            

 

             Hemoglobin (test code = HGB) 12.2 gm/dl   12.0-14.0                

 

 

             Hematocrit (test code = HCT) 36.5 %       37.0-47.0    L           

 

 

             MCV (test code = MCV) 91.3 fL      81.0-99.0                 

 

             MCH (test code = MCH) 30.5 pg      27.0-31.0                 

 

             MCHC (test code = MCHC) 33.4 gm/dl   33.0-37.0                 

 

             RDW (test code = RDWVC) 13.2 %       11.5-14.5                 

 

             Platelet (test code = PLT) 309 10\S\3/ul 130-400                   

 

             MPV (test code = MPV) 8.5 fL       7.4-10.4     A            

 

             NE% (test code = NE) 82.9 %       42.0-75.0    H            

 

             LY% (test code = LY) 10.0 %       13.0-42.0    L            

 

             MO% (test code = MO) 6.2 %        4.0-14.0                  

 

             EO% (test code = EO) 0.2 %        1.0-5.0      L            

 

             BA% (test code = BA) 0.5 %        0.0-3.0                   

 

             IG% (test code = IG%) 0.2 %        0.0-0.4                   



XR HIP COMP 2-3  [id number] 08:46:11Right hip 2 - viewsHISTORY:  Pain and 
decreased range of motion status post fall.COMPARISON: NoneFINDINGS:There is 
intertrochanteric fracture of the right femur, with approximately 1.9cm superior
displacement of the distal fracture fragment with overriding.Degenerative 
changes of the right hip, lower lumbar spine and SI joints.Vascular 
calcificationsIMPRESSION:Displaced intertrochanteric fracture of the right 
femur.This final report was electronically signed by Dr Aayush Mejia MD 
20198:39 AMDictated By: AAYUSH MEJIADate:  2019 08:39US CAROTID 
BILAT [id number] 12:56:46Carotid Doppler ultrasound:History: Smita 
stenosis, history of left CEADuplex scanning, spectral analysis and real-time 
grayscale and color Dopplerimaging of the extracranial cerebrovascular system 
was performed.There is mixed plaque in the right common carotid artery, 
bifurcation andproximal ICA. Limited soft plaque is noted on the left. The 
spectral waveformsshow no significant abnormality on the left and spectral 
broadening in the ICAon the right.The peak systolic velocity in the right ICA is
127 cm/sec. The peakend-diastolic velocity is 20 cm/s.The peak systolic velocity
in the left ICA is 111cm/sec. The end-diastolicvelocity is 29 cm/s.The ICA/CCA 
ratio on the right is 2.5.The ICA/CCA ratioon the left is 1.8.Antegrade flow is 
noted in the vertebral arteries bilaterally.Impression:1. Doppler findings 
consistent with 50-69% stenosis in the proximal right ICA.2. No hemodynamically 
significant stenosis by Doppler on the left.3. Antegrade vertebral artery flow 
is present bilaterally.This final report was electronically signed by Dr Lokesh Esposito MD 201912:50 PMDictated By: LOKESH ESPOSITODate:  2019 12:50
XR FOOT COMP MIN 3 [id number] 17:02:13Right foot 3 viewsDATE OF EXAM: 
2019INDICATION: Right foot pain after recent fallDISCUSSION: AP,oblique and 
lateral views were obtained. No fracture ordislocation is identified. The joint 
spaces are preserved. Mild osteopenia isnoted. The soft tissues show no 
significant abnormality.IMPRESSION:Noacute bony or joint abnormality.This final 
report was electronically signed by Dr Lokesh Esposito MD20194:55 PMDictated
By: LOKESH ESPOSITODate:  2019 16:55XR ANKLE COMP MIN 3 [id number] 
16:44:46Right ankle 3 views:History: Pain and swelling, recent fallAP, oblique 
and lateral views were obtained. Diffuse soft tissue swelling ispresent, greater
medially and anteriorly. There is no fracture or dislocation.No joint effusion 
is noted. The ankle mortise is intact. Mild osteopenia isnoted.Impression: Soft 
tissue swelling with no acute bony or joint abnormalityidentified.This final 
report was electronically signed by Dr Lokesh Esposito MD 20194:38 
PMDictated By: LOKESH ESPOSITODate:  2019 16:38US CAROTID BILAT Doppler
2018 15:03:39Carotid Doppler ultrasound:History: Carotid stenosis, history
of left CEA in 2017Duplex scanning, spectral analysis and real-time grayscale 
and color Dopplerimaging of the extracranial cerebrovascular system was 
performed.There is irregular mixed plaque noted in the right carotid bulb and 
ICA. Changesof left carotid endarterectomy are present.The peak systolic 
velocity in the right ICA is 125 cm/sec.The peak systolic velocity in the left 
ICA is 135 cm/sec.The ICA/CCA ratio on the right is 1.8.The ICA/CCA ratio on the
left is 2.2.Antegrade flow is noted in the vertebral arteries 
bilaterally.Impression:1. Atherosclerotic changes of the right with Doppler 
findings consistent lbtr13-57% stenosis. Similar findings were noted on prior 
study of .2. Interval changes of left carotid endarterectomy 
with no findings by Dopplerto indicate restenosis.This final report was 
electronically signed by Dr Lokesh Esposito MD 20182:57 PMDictated By: 
LOKESH ESPOSITODate:  2018 15:03MM MAMMO SCRN 3D [id number] 16:13:23
Procedure:  MM MAMMO SCREEN DIR DIGITAL, MM MAMMO SCRN 3D TOMOExam Date:  
2017Ordering Provider:  GILDARDO Marleyinical Indication:  
ScreeningComparison:  Lesley 10, 2016 and 2011Findings: 3-D 
tomosynthesis views of both breasts were obtained. The study isinterpreted using
computer-aided detection (CAD).There are scattered fibroglandular densities 
bilaterally. No dominant mass orarchitectural distortion is identified. There 
are no suspicious calcifications.Impression:  No mammographic evidence of 
malignancy.BI-RADS 1: Negative.This final report was electronically signed by Dr Lokesh Esposito MD 20174:07 PMDictated By: LOKESH ESPOSITODate:  
2017 16:13MM MAMMO SCREEN DIR [id number] 16:13:20Procedure:  MM 
MAMMO SCREEN DIR DIGITAL, MM MAMMO SCRN 3D TOMOExam Date:  2017Ordering 
Provider:  GILDARDO Marleyinical Indication:  ScreeningComparison:  Lesley 10, 
2016 and 2011Findings: 3-D tomosynthesis views of both breasts were
obtained. The study isinterpreted using computer-aided detection (CAD).There are
scattered fibroglandular densities bilaterally. No dominant mass orarchitectural
distortion is identified. There are no suspicious calcifications.Impression:  No
mammographic evidence of malignancy.BI-RADS 1: Negative.This final report was 
electronically signed by Dr Lokesh Esposito MD 20174:07 PMDictated By: 
LOKESH ESPOSITODate:  2017 16:[id number] 12:18:00





             Test Item    Value        Reference Range Interpretation Comments

 

             Glucose (test code 88 mg/dl                         



             = GLU)                                              

 

             BUN (test code = 14.0 mg/dl   6.0-17.0                  



             BUN)                                                

 

             Creatinine (test 0.7 mg/dl    0.4-1.2                   



             code = CREA)                                        

 

             Sodium (test code = 135 mmol/l   137-145      L            



             NA)                                                 

 

             Potassium (test 4.6 mmol/l   3.5-5.0                   



             code = K)                                           

 

             Chloride (test code 97 mmol/l           L            



             = CL)                                               

 

             CO2 (test code = 27 mmol/l    22-30                     



             CO2)                                                

 

             Anion Gap (test 12                                     



             code = GAP)                                         

 

             Calcium (test code 9.3 mg/dl    8.4-10.2                  



             = CALC)                                             

 

             T Protein (test 7.8 gm/dl    5.1-8.7                   



             code = TP)                                          

 

             Albumin (test code 4.1 gm/dl    3.5-4.6                   



             = ALB)                                              

 

             A/G Ratio (test 1.1 %        1.1-2.2                   



             code = AGRAT)                                        

 

             AST (SGOT) (test 21 U/L       11-36                     



             code = AST)                                         

 

             ALT (SGPT) (test 19 U/L       11-40                     



             code = ALT)                                         

 

             Alkaline Phos (test 80 U/L                           



             code = ALKP)                                        

 

             Total Bilirubin 0.7 mg/dl    0.2-1.2                   



             (test code = TBIL)                                        

 

             Globulin (test code 3.7 gm/dl    2.3-3.5      H            



             = GLOBU)                                            

 

             Calcium, Corrected 9.2 mg/dl    8.4-10.2                  Various f

ormulas exist



             (test code =                                        for corrected s

nette



             CALCCORR)                                           calcium results

, each



                                                                 yielding differ

ent



                                                                 values.  This



                                                                 corrected resul

t was



                                                                 based on the fo

rmula:



                                                                 Corrected Calci

um =



                                                                 SerumCalcium + 

[0.8 *



                                                                 ( 4 - SerumAlbu

min)]

 

             EGFR if  >60                                    



             American (test code mL/min/1.73m\                           



             = EGFRAA)    S\2                                    

 

             EGFR if Non- >60                                    Estimate

d Glomerular



             American (test code mL/min/1.73m\                           Filtrat

ion Rate (eGFR)



             = EGFRNA)    S\2                                    Reference Inter

vals



                                                                 Decision Points

 for 18



                                                                 years and older

 and



                                                                 average body ma

ss:



                                                                  >=  60



                                                                    Does not exc

lude



                                                                 kidney disease.

 30 -



                                                                 59



                                                                 Suggests modera

te



                                                                 chronic kidney 

disease



                                                                 and



                                                                         indicat

es the



                                                                 need for furthe

r



                                                                 investigation



                                                                 



                                                                 including asses

sment



                                                                 of proteinuria 

and



                                                                 



                                                                 



                                                                 cardiovascular



                                                                 factors. < 30



                                                                      Usually in

dicates



                                                                 a need for refe

rral



                                                                 for assessment



                                                                                

    and



                                                                 management of c

hronic



                                                                 kidney failure.



[id number] 12:08:00





             Test Item    Value        Reference Range Interpretation Comments

 

             aPTT (test code = PTT) 25.4 seconds 25.3-35.7                 



PT AND [id number] 12:08:00





             Test Item    Value        Reference Range Interpretation Comments

 

             Protime (test code 10.0 seconds 9.0-11.8                  



             = PT)                                               

 

             INR (test code = 1.0          0.9-1.1                   INR results

 are



             INR)                                                intended ONLY t

o



                                                                 monitor Oral



                                                                 Anticoagulant t

herapy



                                                                 in stablized pa

tients.



                                                                 The INR Therape

utic



                                                                 Range is 2.0 - 

3.0



                                                                 Patients with a



                                                                 mechanical hear

t, the



                                                                 INR Range is 2.

5 - 3.5



CBC (HEMOGRAM ONLY)2017 12:03:00





             Test Item    Value        Reference Range Interpretation Comments

 

             WBC (test code = WBC) 10.9 k/ul    4.8-10.8     H            

 

             RBC (test code = RBC) 3.93 Millions/ul 4.20-5.40    L            

 

             Hemoglobin (test code = HGB) 11.9 gm/dl   12.0-14.0    L           

 

 

             Hematocrit (test code = HCT) 36.3 %       37.0-47.0    L           

 

 

             MCV (test code = MCV) 92.3 fL      81.0-99.0                 

 

             MCH (test code = MCH) 30.4 pg      27.0-31.0                 

 

             MCHC (test code = MCHC) 32.9 gm/dl   33.0-37.0    L            

 

             RDW (test code = RDWVC) 14.4 %       11.5-14.5                 

 

             Platelet (test code = PLT) 332 k/ul     130-400                   

 

             MPV (test code = MPV) 7.6 fL       7.4-10.4                  



[id number] 17:52:00





             Test Item    Value        Reference Range Interpretation Comments

 

             Glucose (test code 119 mg/dl           H            



             = GLU)                                              

 

             BUN (test code = 17.0 mg/dl   6.0-17.0                  



             BUN)                                                

 

             Creatinine (test 0.9 mg/dl    0.4-1.2                   



             code = CREA)                                        

 

             Sodium (test code = 134 mmol/l   137-145      L            



             NA)                                                 

 

             Potassium (test 4.0 mmol/l   3.5-5.0                   



             code = K)                                           

 

             Chloride (test code 94 mmol/l           L            



             = CL)                                               

 

             CO2 (test code = 28 mmol/l    22-30                     



             CO2)                                                

 

             Calcium (test code 9.1 mg/dl    8.4-10.2                  



             = CALC)                                             

 

             T Protein (test 8.0 gm/dl    5.1-8.7                   



             code = TP)                                          

 

             Albumin (test code 4.1 gm/dl    3.5-4.6                   



             = ALB)                                              

 

             A/G Ratio (test 1.1 %        1.1-2.2                   



             code = AGRAT)                                        

 

             AST (SGOT) (test 28 U/L       11-36                     



             code = AST)                                         

 

             ALT (SGPT) (test 25 U/L       11-40                     



             code = ALT)                                         

 

             Alkaline Phos (test 101 U/L                          



             code = ALKP)                                        

 

             Total Bilirubin 0.3 mg/dl    0.2-1.2                   



             (test code = TBIL)                                        

 

             Globulin (test code 3.9 gm/dl    2.3-3.5      H            



             = GLOBU)                                            

 

             Anion Gap (test 11                                     



             code = GAP)                                         

 

             Calcium, Corrected 9.0 mg/dl    8.4-10.2                  Various f

ormulas exist



             (test code =                                        for corrected s

nette



             CALCCORR)                                           calcium results

, each



                                                                 yielding differ

ent



                                                                 values.  This



                                                                 corrected resul

t was



                                                                 based on the fo

rmula:



                                                                 Corrected Calci

um =



                                                                 SerumCalcium + 

[0.8 *



                                                                 ( 4 - SerumAlbu

min)]

 

             EGFR if  >60                                    



             American (test code mL/min/1.73m\                           



             = EGFRAA)    S\2                                    

 

             EGFR if Non- >60                                    Estimate

d Glomerular



             American (test code mL/min/1.73m\                           Filtrat

ion Rate (eGFR)



             = EGFRNA)    S\2                                    Reference Inter

vals



                                                                 Decision Points

 for 18



                                                                 years and older

 and



                                                                 average body ma

ss:



                                                                  >=  60



                                                                    Does not exc

lude



                                                                 kidney disease.

 30 -



                                                                 59



                                                                 Suggests modera

te



                                                                 chronic kidney 

disease



                                                                 and



                                                                         indicat

es the



                                                                 need for furthe

r



                                                                 investigation



                                                                 



                                                                 including asses

sment



                                                                 of proteinuria 

and



                                                                 



                                                                 



                                                                 cardiovascular



                                                                 factors. < 30



                                                                      Usually in

dicates



                                                                 a need for refe

rral



                                                                 for assessment



                                                                                

    and



                                                                 management of c

hronic



                                                                 kidney failure.



PT AND [id number] 17:36:00





             Test Item    Value        Reference Range Interpretation Comments

 

             Protime (test code 10.0 seconds 9.0-11.8                  



             = PT)                                               

 

             INR (test code = 1.0          0.9-1.1                   INR results

 are



             INR)                                                intended ONLY t

o



                                                                 monitor Oral



                                                                 Anticoagulant t

herapy



                                                                 in stablized pa

tients.



                                                                 The INR Therape

utic



                                                                 Range is 2.0 - 

3.0



                                                                 Patients with a



                                                                 mechanical hear

t, the



                                                                 INR Range is 2.

5 - 3.5



[id number] 17:36:00





             Test Item    Value        Reference Range Interpretation Comments

 

             aPTT (test code = PTT) 26.6 seconds 25.3-35.7                 



URINALYSIS WITH [id number] 17:09:00





             Test Item    Value        Reference Range Interpretation Comments

 

             Color (test code = UCOLR) Lt. Yellow                             

 

             Clarity (test code = UCLAR) SL CLOUDY                              

 

             Glucose (test code = UGLUC) NEGATIVE     NEGATIVE     N            

 

             Bilirubin (test code = UBILI) NEGATIVE     NEGATIVE     N          

  

 

             Ketones (test code = UKET) TRACE        NEGATIVE     A            

 

             Specific Gravity (test code = 1.010        1.005-1.030  A          

  



             USPGR)                                              

 

             Blood (test code = UBLD) MODERATE     NEGATIVE     A            

 

             PH (test code = UPH) 6.5          4.5-8.0      A            

 

             Protein (test code = UPROT) NEGATIVE     NEGATIVE     N            

 

             Urobilinogen (test code = U UROB) 0.2          >0.2         N      

      

 

             Nitrite (test code = UNITR) NEGATIVE     NEGATIVE     N            

 

             Leukocyte Esterase (test code = LARGE        NEGATIVE     A        

    



             ULEUK)                                              

 

             WBC (test code = WBCUR) 10-20        0-5          A            

 

             RBC (test code = RBCUR) 3-5          0-5          A            

 

             Epithial Cells (test code = U EPI) TNTC         0-10         A     

       

 

             Mucous (test code = UMUC) None Seen    None Seen    N            

 

             Bacteria (test code = UBACT) 1+           None Seen,Trace A        

    



CBC (HEMOGRAM ONLY)2017 16:56:00





             Test Item    Value        Reference Range Interpretation Comments

 

             WBC (test code = WBC) 8.2 k/ul     4.8-10.8                  

 

             RBC (test code = RBC) 4.29 Millions/ul 4.20-5.40                 

 

             Hemoglobin (test code = HGB) 13.2 gm/dl   12.0-14.0                

 

 

             Hematocrit (test code = HCT) 39.6 %       37.0-47.0                

 

 

             MCV (test code = MCV) 92.3 fL      81.0-99.0                 

 

             MCH (test code = MCH) 30.8 pg      27.0-31.0                 

 

             MCHC (test code = MCHC) 33.4 gm/dl   33.0-37.0                 

 

             RDW (test code = RDWVC) 14.3 %       11.5-14.5                 

 

             Platelet (test code = PLT) 427 k/ul     130-400      H            

 

             MPV (test code = MPV) 7.5 fL       7.4-10.4

## 2020-08-24 NOTE — R.HP
HISTORY AND PHYSICAL



FACILITY:



[facility]



ENCOUNTER DATE AND TIME:



08/24/2020 18:07 (CDT)



MR#:



[id number]



ACCT#:



[id number]



NAME



HENRIETTA MATIAS



ADDRESS:



[address] [place] [address], [address]



CITY:



[place]



ZIP



[id number]



PHONE:



[phone]



YOB: 1932



AGE:



88



SSN#



[id number]



GENDER:



Female



DEXTERITY



Right-handed



MARITAL STATUS







RACE



Unknown race



PRE-HOSPITAL LIVING SETTING



01 - Home (private home/apt. board/care, assisted living, group home, transitional living) 



PRE-HOSPITAL LIVING WITH



Alone 



ENCOUNTER PHYSICIAN:



Dr. Edgar Dick M.D.



REFERRING DOCTOR:



ADOLFO LEONARD



DATE OF ADMISSION:



08/24/2020 16:07 (CDT)



REFERRING FACILITY



[facility] 



HOME TYPE AND DETAILS:



Type of home: single family house

# of levels in the residence: 1

# of steps within the residence: 0

# of steps to enter the residence: 0



ONSET DATE:



08/24/2020



PRIMARY DIAGNOSIS-RELATED SURGERIES:



No surgeries related to the primary diagnosis were performed.



HISTORY OF PRESENT ILLNESS (HPI):



Pt. is a 87 yo Right-handed female of unknown race.

On 08/24/2020 she was admitted to [facility] with diagnosis OSTEOPEROSIS.

Her impairment category is Debility 16 -  Debility (16).

Pre-morbidly, Pt. was independent/mod-I in Safety Awareness, Social Cognition, Transfers Control, Sph
incter Control, and Endurance; and she had good Communication and Self-Care.

Currently, she has deficits of Self-Care, Locomotion, Safety Awareness, Balance, Transfers Control, S
ocial Cognition, and Sphincter Control.

Pt. is now referred to [facility] for acute in-patient rehabilitation in order
 to maximize patient's functional independence in activities of daily living, strength, ROM, and mobi
lity.

Patient has realistic goal of being discharged at assistance level 6-Rafaela to reside at Home with  Pt 
self.





MEDICATION ALLERGIES:



No Known Drug Allergies (NKDA)



ENVIRONMENTAL ALLERGIES:



- Substance Allergies

None Known

- Other Allergies

None Known



SOCIAL HISTORY:



- Home Living

Alone



REVIEW OF SYSTEMS:



- Gen

No Chills

Fatigue

No Fever

- Eyes

No Double Vision

No itchiness

- ENMT

No Difficulty Swallowing

- CVS

No Chest Discomfort

No Chest Pain

Fatigue

No Weight Gain

- Resp

No Cough

Shortness of Breath

- GI

Continent

No Abdominal Pain

No Constipation

No Diarrhea

- 

Continent

No Kidney Pain

No Painful Urination

No Urinary Urgency

- MSK

No Joint Pain

Muscle Cramps

Stiffness

- Skin

No Itching

No Rash

No Suspicious Lesions

- Neuro

Coordination Difficulty

No Difficulty with Concentration

No Memory Loss

No Seizures

Weakness

- Psych

No Anxiety

No Depression



No HIV Exposure

No Persistent Infections

No Seasonal Allergies

- Endo

No Cold/Heat Intolerance

No Excessive Hunger

No Excessive Thirst

No Excessive Urination



PHYSICAL EXAM



- Gen

Oriented to: person, time, and place

- Vital Signs

Vital signs stable, afebrile

- Skin

No skin breakdown.



Normacephalic

- Eyes

No abnormalities

- Neck

No abnormalities

- CVS

RRR

- Chest

No abnormalities

- Resp

Clear to auscultation

- Abd

Soft

- GI

Non distended



Deferred

- 

No abnormalities

- Ext

No significant edema

- MSK

4/5 weakness in both lower extremities.

- Neuro

No focal deficits

- Psych

No abnormalities



VITAL SIGNS



Temperature: 99 F

SBP/DBP: 117/48

Pulse: 71

Resp: 16

Vital signs stable, afebrile



NURSING:



- Shower

allowing shower



ACTIVITIES



OOB only with supervision



QI SCORES:



- Self-Care

A. Eating  06-Independent  

B. Oral hygiene  04-Supervision or touching assistance  

C. Toileting hygiene  04-Supervision or touching assistance  

E. Shower/bathe self  03-Partial/moderate assistance  

F. Upper body dressing  03-Partial/moderate assistance  

G. Lower body dressing  03-Partial/moderate assistance  

H. Putting on/taking off footwear  03-Partial/moderate assistance  

- Mobility

A. Roll left and right  03-Partial/moderate assistance  

B. Sit to lying  03-Partial/moderate assistance  

C. Lying to sitting on side of bed  03-Partial/moderate assistance  

D. Sit to stand  03-Partial/moderate assistance  

E. Chair/bed-to-chair transfer  03-Partial/moderate assistance  

F. Toilet transfer  03-Partial/moderate assistance  

G. Car transfer  03-Partial/moderate assistance  

I. Walk 10 feet  03-Partial/moderate assistance  

J. Walk 50 feet with two turns  88-Not attempted due to medical condition or safety concerns  

K. Walk 150 feet  88-Not attempted due to medical condition or safety concerns  

L. Walking 10 feet on uneven surfaces  88-Not attempted due to medical condition or safety concerns  


M. 1 step (curb)  88-Not attempted due to medical condition or safety concerns  

N. 4 steps  88-Not attempted due to medical condition or safety concerns  

O. 12 steps  88-Not attempted due to medical condition or safety concerns  

P. Picking up object  88-Not attempted due to medical condition or safety concerns  

R. Wheel 50 feet with two turns  88-Not attempted due to medical condition or safety concerns  

S. Wheel 150 feet  88-Not attempted due to medical condition or safety concerns  

- Bladder and Bowel

Bladder continence  0-Always continent  

Bowel continence  0-Always continent  

- Endurance

Fair

- Balance

Fair

- Safety Awareness

Fair



CURRENT FUNC. DEFICITS:



Endurance, Balance, Mobility, Safety Awareness, and Self-Care



MEDICATIONS:



- Other

See attached MAR (Medication Administration Record)



ASSESSMENT:



Pt. is a 87 yo Right-handed female of unknown race.On 08/24/2020 she was admitted to [facility] with diagnosis OSTEOPEROSIS.Her impairment category is Debility 16 -  Debility (16).Pre-morbi
dly, Pt. was independent/mod-I in Safety Awareness, Social Cognition, Transfers Control, Sphincter Co
ntrol, and Endurance; and she had good Communication and Self-Care.Currently, she has deficits of Zahida
f-Care, Locomotion, Safety Awareness, Balance, Transfers Control, Social Cognition, and Sphincter Con
trol.Pt. is now referred to [facility] for acute in-patient rehabilitation in 
order to maximize patient's functional independence in activities of daily living, strength, ROM, and
 mobility.- Rehab Goal

Patient has realistic goal of being discharged at assistance level 6-Rafaela to reside at Home with  Pt 
self.





- Physical Therapy

Gait dysfunction - to improve, our physical therapists will perform initial evaluation of pt's status
 upon admission and devise an individualized program for Gait Training, and Wheel Chair mobility

Inability to transfer - to improve, our physical therapists will perform initial evaluation of pt's s
tatus upon admission and devise an individualized program for Bed mobility

Need for home safety evaluation - to improve, our physical therapists will perform initial evaluation
 of pt's status upon admission and devise an individualized program for Home Evaluation

Need in caregiver upon discharge - to improve, our physical therapists will perform initial evaluatio
n of pt's status upon admission and devise an individualized program for Caregiver Training

 Edema - to improve, our physical therapists will perform initial evaluation of pt's status upon admi
ssion and devise an individualized program for Elevation Training, and Lymphedema Therapy

New precaution - to improve, our physical therapists will perform initial evaluation of pt's status u
[place] admission and devise an individualized program for Patient precaution education

Poor balance - to improve, our physical therapists will perform initial evaluation of pt's status upo
n admission and devise an individualized program for Balance Training

Weakness - to improve, our physical therapists will perform initial evaluation of pt's status upon ad
mission and devise an individualized program for Aquatic Therapy, Neuromuscular Reeducation, and Stre
ngthening

 Achieving independence - to improve, our physical therapists will perform initial evaluation of pt's
 status upon admission and devise an individualized program for Community Reintegration Activities

- Occupational Therapy

ADL deficits - to improve, our occupation therapists will perform initial evaluation of pt's status u
[place] admission and devise an individualized program for Bathing, Bed mobility, Community Reintegration
, Cooking, Dressing, Eating, Fine Motor Skills, Grooming, Homemaking, Kitchen Mobility, Laundry, Lynda
ent Education, Safety Awareness, Splinting - Positioning, Transfers(Toilet, Tub, Shower), and Wheel C
hair Management

Cognitive deficits - to improve, our occupation therapists will perform initial evaluation of pt's st
atus upon admission and devise an individualized program for Cognition - orientation

Need for care giver - to improve, our occupation therapists will perform initial evaluation of pt's s
tatus upon admission and devise an individualized program for Caregiver Training

Weakness - to improve, our occupation therapists will perform initial evaluation of pt's status upon 
admission and devise an individualized program for Aquatic Therapy, Balance, Endurance, UE ROM, and U
E strengthening



MEDICAL PLAN:



- Diet Type

Start Regular

- Diet - Liquid Texture

Start Regular

- Tube Feed

Start N/A

- Other

See attached MAR (Medication Administration Record)

- Diet - Solid Texture

Regular

- Shower

 shower



DISCHARGE PLAN:



- Estimated Length of Stay (days)

13. 



- Consensus on plan

Discharge plan has been discussed with primary caregiver. Patient/Family is in agreement with the jo
n. Primary caregiver is in agreement with the plan. 



- Patient/Family Goals

Return home independently. 



- Planned Living Setting Upon Discharge

Home, to live alone. Transitional Living. Primary caregiver: Pt self. 



SIGNATURE PANEL:



Electronically signed by Dr. Edgar Dick M.D. on 08/24/2020 at 18:11 (CDT)

## 2020-08-24 NOTE — PAPE
POST ADMISSION PHYSICIAN EVALUATION



PATIENT:



[facility] 



MR#



[id number] 



ACCT#



[id number] 



REFERRING DOCTOR



ADOLFO LEONARD



EVALUATION DATE AND TIME



08/24/2020 18:12 (CDT) 



NAME



HENRIETTA MATIAS



DATE OF BIRTH



1932 



AGE



88 



PHONE



[phone] 



SSN#



[id number] 



GENDER



female 



EVALUATING PHYSICIAN



Dr. Edgar Dick M.D. 



ADMISSION DIAGNOSIS:



OSTEOPEROSIS



ONSET DATE



08/24/2020



POST-ADMISSION FUNCTIONAL/MEDICAL STATUS:



- Bladder

Same accident frequency: Ind - No accidents in the past 7 days

- Bowel

Same accident frequency: Ind - No accidents in the past 7 days

- Walking

Same score based on distance walked: 0(N/A)

Same score based on distance walked: 1(<=50ft)

- Wheelchair

Same score based on distance traveled: 0(N/A)



STATUS CHANGE EVALUATION:



No change in Functional or Medical Status is identified compared with Pre-Admission screening.



PATIENT NEEDS CLOSE MEDICAL SUPERVISION BY A REHABILITATION PHYSICIAN FOR:



Coordination of Treatment Team



PATIENT REQUIRES 24X7 REHAB NURSING FOR MEDICAL AND FUNCTIONAL MGT. OF THE FOLLOWING DEFICITS:



Disease Management

Medication Management

Patient/Family Education

Providing Safe Environment



PATIENT REQUIRES INTENSIVE, COORDINATED INTERDISCIPLINARY APPROACH TO REHAB:



Arranging Home Equipment/Services

Discharge Planning

Family Intervention/Training

/Case Management



LIST OF IDENTIFIED AND POTENTIAL PROBLEMS:



Alteration in leisure activities

Bladder, Incontinence

Bowel, Incontinence

Infection, Actual or Potential

Mobility Impaired

Pain, Alteration in Comfort

Self Care Deficit

Skin Integrity, Actual or Potential

Urinary Tract Infection (UTI), Actual or Potential



PATIENT COULD BE AT RISK FOR COMPLICATIONS FROM ADVERSE MEDICAL CONDITIONS DUE TO HIS/HER COMORBIDITI
ES AND THE RIGORS OF THE INTENSIVE REHABILLITATION PROGRAM. METHODS OR INTERVENTIONS TO AVOID COMPLIC
ATIONS INCLUDE:



- Infection

Clinical staff to assess and manage the signs and symptoms of infection including fever, redness, war
mth, etc. 



- Urinary Tract Infection





- Falls

Patient will be evaluated for Fall Precautions and will be placed on Fall Precautions as indicated pe
r protocol. 



- Skin Breakdown

Nursing will assess skin daily using assessment tool and will place on Skin Breakdown Precautions as 
indicated per protocol. 



- Pain

Clinical staff may employ non-medication methods such as massage, distraction, decrease stimulus, etc
. as needed. Clinical staff will assess patient's pain level every shift per protocol to assess and e
nsure pain management effectiveness. Medications will be given and the pain level re-assessed. 



PRELIMINARY PLAN OF CARE:



- Physical Therapy

Patient needs Physical Therapy for a daily minimum of 1.5 hours at least 5 out of 7 days, to improve:
 Mobility, Strengthening, Transfers, Stretching, ROM, Endurance, Ability to manage stairs, Gait, and 
Balance. 



- Rehabilitation Nursing

Patient requires 24x7 Rehabilitation Nursing for: Pain Issues, Identifying and preventing risk factor
s, Monitoring and reporting current medical conditions, Assisting with ambulation and transfer, Gautam
ting with all ADL-s, Teaching patients about disease process and medications, Family teaching, Provid
ing safe environment, Bowel and Bladder Issues, Skin Integrity, and Medication Management. 





Patient needs  and/or Case Management for: Discharge Planning, Arranging Home Equipmen
t or Services, and Family Interventions. 



- Dietary and Nutrition Services

Patient needs Dietary and Nutrition Services for: Adequate Nutrition, Nutritional Supplements, and Nu
tritional Education. 



- Occupational Therapy

Patient needs Occupational Therapy for a daily minimum of 1.5 hours at least 5 out of 7 days, to impr
ove Activities of Daily Living, including: Eating, Grooming, Bathing, Dressing, Toileting, Toilet Tra
nsfers, Community Reintegration, Higher functional activities, Adaptive Equipment, Splinting, Househo
ld Tasks, and Other activities as determined. 



QI SCORES:



- Self-Care

A. Eating  06-Independent  

B. Oral hygiene  04-Supervision or touching assistance  

C. Toileting hygiene  04-Supervision or touching assistance  

E. Shower/bathe self  03-Partial/moderate assistance  

F. Upper body dressing  03-Partial/moderate assistance  

G. Lower body dressing  03-Partial/moderate assistance  

H. Putting on/taking off footwear  03-Partial/moderate assistance  

- Mobility

A. Roll left and right  03-Partial/moderate assistance  

B. Sit to lying  03-Partial/moderate assistance  

C. Lying to sitting on side of bed  03-Partial/moderate assistance  

D. Sit to stand  03-Partial/moderate assistance  

E. Chair/bed-to-chair transfer  03-Partial/moderate assistance  

F. Toilet transfer  03-Partial/moderate assistance  

G. Car transfer  03-Partial/moderate assistance  

I. Walk 10 feet  03-Partial/moderate assistance  

J. Walk 50 feet with two turns  88-Not attempted due to medical condition or safety concerns  

K. Walk 150 feet  88-Not attempted due to medical condition or safety concerns  

L. Walking 10 feet on uneven surfaces  88-Not attempted due to medical condition or safety concerns  


M. 1 step (curb)  88-Not attempted due to medical condition or safety concerns  

N. 4 steps  88-Not attempted due to medical condition or safety concerns  

O. 12 steps  88-Not attempted due to medical condition or safety concerns  

P. Picking up object  88-Not attempted due to medical condition or safety concerns  

R. Wheel 50 feet with two turns  88-Not attempted due to medical condition or safety concerns  

S. Wheel 150 feet  88-Not attempted due to medical condition or safety concerns  

- Bladder and Bowel

Bladder continence  0-Always continent  

Bowel continence  0-Always continent  

- Endurance

Fair

- Balance

Fair

- Safety Awareness

Fair



POTENTIAL FUNCTIONAL GOALS FOR PATIENT TO ACHIEVE BY DISCHARGE:



- Safety Precaution

Patient will remain free from falls or injury at time of discharge. 



- Bed Mobility

Patient will perform bed mobility at 4-Luis level of assistance. 



- Transfers

Patient will complete transfers from bed to chair at 4-Luis level of assistance. 



- Mobility

Patient will ambulate 150 ft with 4-Luis level of assistance with RW. 



PATIENT REHAB POTENTIAL



URI MATIAS is able and expected to receive 3 hours of individualized therapy daily on at least 5 of mary
ry 7 days

URI MATIAS's prognosis for significant practical improvement within a reasonable period of time appears
 Good

Expected level of measurable improvement will be of a practical value to URI MATIAS's functional capaci
ty or adaptations to impairments

Has a viable Discharge Plan

Medically appropriate; condition is sufficiently stable to participate in intensive rehab program



DISCHARGE PLAN:



- Estimated Length of Stay (days)

13. 



- Consensus on plan

Discharge plan has been discussed with primary caregiver. Patient/Family is in agreement with the jo
n. Primary caregiver is in agreement with the plan. 



- Patient/Family Goals

Return home independently. 



- Planned Living Setting Upon Discharge

Home, to live alone. Transitional Living. Primary caregiver: Pt self. 



CONCLUSION ON REHABILITATION NECESSITY:



I have evaluated patient's pre-admission functional status and, comparing it to the patient's post-ad
mission functional status now, I conclude that the pre-admission assessment was accurate. Patient's c
ondition on admission supports the medical necessity of admission to IRF. It is safe to proceed with 
patient's therapy program.



SIGNATURE PANEL:



Electronically signed by Dr. Edgar Dick M.D. on 08/24/2020 at 18:13 (CDT)

## 2020-08-25 LAB
ALBUMIN SERPL BCP-MCNC: 3 G/DL (ref 3.4–5)
BUN BLD-MCNC: 11 MG/DL (ref 7–18)
GLUCOSE SERPLBLD-MCNC: 88 MG/DL (ref 74–106)
HCT VFR BLD CALC: 33.7 % (ref 36–45)
LYMPHOCYTES # SPEC AUTO: 2.8 K/UL (ref 0.7–4.9)
MAGNESIUM SERPL-MCNC: 2.2 MG/DL (ref 1.8–2.4)
PMV BLD: 7.1 FL (ref 7.6–11.3)
POTASSIUM SERPL-SCNC: 4.2 MMOL/L (ref 3.5–5.1)
PREALB SERPL-MCNC: 17.1 MG/DL (ref 20–40)
RBC # BLD: 3.74 M/UL (ref 3.86–4.86)

## 2020-08-25 RX ADMIN — PREGABALIN SCH MG: 50 CAPSULE ORAL at 20:33

## 2020-08-25 RX ADMIN — APIXABAN SCH MG: 2.5 TABLET, FILM COATED ORAL at 20:33

## 2020-08-25 RX ADMIN — PREGABALIN SCH MG: 50 CAPSULE ORAL at 09:14

## 2020-08-25 RX ADMIN — EZETIMIBE SCH MG: 10 TABLET ORAL at 09:09

## 2020-08-25 RX ADMIN — MELATONIN PRN MG: 3 TAB ORAL at 20:33

## 2020-08-25 RX ADMIN — Medication SCH MG: at 20:42

## 2020-08-25 RX ADMIN — MONTELUKAST SODIUM SCH MG: 10 TABLET, FILM COATED ORAL at 09:09

## 2020-08-25 RX ADMIN — TRAMADOL HYDROCHLORIDE PRN MG: 50 TABLET, COATED ORAL at 18:28

## 2020-08-25 RX ADMIN — LOSARTAN POTASSIUM AND HYDROCHLOROTHIAZIDE SCH: 50; 12.5 TABLET, FILM COATED ORAL at 08:00

## 2020-08-25 RX ADMIN — SENNOSIDES AND DOCUSATE SODIUM PRN TAB: 8.6; 5 TABLET ORAL at 20:33

## 2020-08-25 RX ADMIN — APIXABAN SCH MG: 2.5 TABLET, FILM COATED ORAL at 09:10

## 2020-08-25 NOTE — P.HP
Certification for Inpatient


Patient admitted to: Inpatient


With expected LOS: >2 Midnights


Patient will require the following post-hospital care: Rehabilitation


Practitioner: I am a practitioner with admitting privileges, knowledge of 

patient current condition, hospital course, and medical plan of care.


Services: Services provided to patient in accordance with Admission requirements

found in Title 42 Section 412.3 of the Code of Federal Regulations





Patient History


Date of Service: 08/25/20


Reason for admission: WEAK AFTER FALL AT HOME.


History of Present Illness: 





MR. MATIAS IS FRAIL LADY WITH DJD, OSTEROPOROSIS, WEAK R LEG SINCE SURGERY ON R 

HIP THAT WAS NOT TOTALLY SUCCESSFUL.  SHE FELL AT HOME IS IN PAIN, NOT STEADY 

AND NEEDS STRENGTHENING. I ADMITTED TO REHAB DIRECTLY.


Allergies





No Known Allergies Allergy (Verified 08/24/20 12:08)


   





Home Medications: 








Diclofenac Sodium 1 sissy TOP DAILY 08/24/20 


Ezetimibe [Zetia] 10 mg PO DAILY 08/24/20 


Levothyroxine [Synthroid] 37.5 mcg PO [id number] 08/24/20 


Losartan/Hydrochlorothiazide [Losartan-Hctz 50-12.5 mg Tab] 1 each PO DAILY 

08/24/20 


Meclizine HCl 25 mg PO TID PRN 08/24/20 


Mirabegron [Myrbetriq] 25 mg PO BEDTIME 08/24/20 


Montelukast Sodium 10 mg PO DAILY 08/24/20 


Pregabalin [Lyrica] 100 mg PO BID 08/24/20 








- Past Medical/Surgical History


Has patient received pneumonia vaccine in the past: Yes





- Social History


Smoking Status: Never smoker


Alcohol use: No


CD- Drugs: No


Caffeine use: No


Place of Residence: Home





Review of Systems


10-point ROS is otherwise unremarkable


General: Weakness, Malaise





Physical Examination





- Vital Signs


Temperature: 96.8 F


Blood Pressure: 114/52


Pulse: 65


Respirations: 16


Pulse Ox (%): 95





- Physical Exam


General: Cachectic, Mild distress, Moderate distress


HEENT: Atraumatic, PERRLA, Mucous membr. moist/pink, EOMI, Sclerae nonicteric


Neck: Supple, 2+ carotid pulse no bruit, No LAD, Without JVD or thyroid 

abnormality


Respiratory: Clear to auscultation bilaterally, Normal air movement


Cardiovascular: Regular rate/rhythm, Normal S1 S2


Gastrointestinal: Normal bowel sounds, No tenderness


Musculoskeletal: No tenderness


Integumentary: No rashes


Neurological: Abnormal strength (R LL 4/5 SINCE SURGERY ON HIP.)


Lymphatics: No axilla or inguinal lymphadenopathy





- Studies


Laboratory Data (last 24 hrs)





08/25/20 05:59: Sodium 137, Potassium 4.2, BUN 11, Creatinine 0.61, Glucose 88, 

Magnesium 2.2


08/25/20 05:59: WBC 6.3, Hgb 11.9 L, Hct 33.7 L, Plt Count 259








Assessment and Plan





- Problems (Diagnosis)


(1) General weakness


Current Visit: Yes   Status: Chronic   


Plan: 


WORSE LATELY. 


CONT PT. 


DAILY VISIT. 


DIETARY CONSULT. 








(2) History of hip fracture


Current Visit: Yes   Status: Acute   





- Advance Directives


Does patient have a Living Will: Yes


Does patient have a Durable POA for Healthcare: Yes

## 2020-08-25 NOTE — R.PN
PROGRESS NOTES



ENCOUNTER DATE AND TIME:



08/25/2020 17:47 (CDT)



NAME



HENRIETTA MATIAS



YOB: 1932



DATE OF ADMISSION:



08/24/2020 16:07 (CDT)



OSTEOPEROSISCHIEF COMPLAINT:



Bilateral knee pain and weakness, osteoperosis



SUBJECTIVE:



Pt denied any depression.

Pt denied any Shortness of Breath.

Patient states that pain is under control.

WBC 6.3, Hgb 11.9, prealbumin 17.1, Ca 8.4, UA esterase trace, bacteria 20-50, COVID-19 negative.

ADLs done with min assistance to independence. Ambulated 100' with fair tolerance using a rolling wal
ker.



VITAL SIGNS



Temperature: 98.0 F

SBP/DBP: 114/52

Pulse: 65

Resp: 16



MEDICATION ALLERGIES:



No Known Drug Allergies (NKDA)



ENVIRONMENTAL ALLERGIES:



- Substance Allergies

None Known

- Other Allergies

None Known



NURSING:



- Shower

allowing shower



ACTIVITIES



OOB only with supervision



THERAPIES:



- Dietary and Nutrition

Adequate Nutrition. Nutritional Education. Nutritional Supplements. 



PHYSICAL EXAM



- Gen

Oriented to: person, time, and place

- Vital Signs

Vital signs stable, afebrile

- Skin

No skin breakdown.



Normacephalic

- Eyes

No abnormalities

- Neck

No abnormalities

- CVS

RRR

- Chest

No abnormalities

- Resp

Clear to auscultation

- Abd

Soft

- GI

Non distended



Deferred

- 

No abnormalities

- Ext

No significant edema

- MSK

4/5 weakness in both lower extremities.

- Neuro

No focal deficits

- Psych

No abnormalities



ASSESSMENT:



Pt. is a 89 yo Right-handed female of unknown race.On 08/24/2020 she was admitted to [facility] with diagnosis OSTEOPEROSIS.Her impairment category is Debility 16 -  Debility (16).Pre-morbi
dly, Pt. was independent/mod-I in Safety Awareness, Social Cognition, Transfers Control, Sphincter Co
ntrol, and Endurance; and she had good Communication and Self-Care.Currently, she has deficits of Zahida
f-Care, Locomotion, Safety Awareness, Balance, Transfers Control, Social Cognition, and Sphincter Con
trol.Pt. is now referred to [facility] for acute in-patient rehabilitation in 
order to maximize patient's functional independence in activities of daily living, strength, ROM, and
 mobility.- Rehab Goal

Patient has realistic goal of being discharged at assistance level 6-Rafaela to reside at Home with  Pt 
self.

MDM/PLAN:



- Physical Therapy

 Gait dysfunction - to improve, our physical therapists will perform initial evaluation of pt's statu
s upon admission and devise an individualized program for Gait Training, and Wheel Chair mobility

 Inability to transfer - to improve, our physical therapists will perform initial evaluation of pt's 
status upon admission and devise an individualized program for Bed mobility

 Need for home safety evaluation - to improve, our physical therapists will perform initial evaluatio
n of pt's status upon admission and devise an individualized program for Home Evaluation

 Need in caregiver upon discharge - to improve, our physical therapists will perform initial evaluati
on of pt's status upon admission and devise an individualized program for Caregiver Training

Edema - to improve, our physical therapists will perform initial evaluation of pt's status upon admis
sushil and devise an individualized program for Elevation Training, and Lymphedema Therapy

 New precaution - to improve, our physical therapists will perform initial evaluation of pt's status 
upon admission and devise an individualized program for Patient precaution education

 Poor balance - to improve, our physical therapists will perform initial evaluation of pt's status up
on admission and devise an individualized program for Balance Training

 Weakness - to improve, our physical therapists will perform initial evaluation of pt's status upon a
dmission and devise an individualized program for Aquatic Therapy, Neuromuscular Reeducation, and Str
engthening

Achieving independence - to improve, our physical therapists will perform initial evaluation of pt's 
status upon admission and devise an individualized program for Community Reintegration Activities

- Occupational Therapy

 ADL deficits - to improve, our occupation therapists will perform initial evaluation of pt's status 
upon admission and devise an individualized program for Bathing, Bed mobility, Community Reintegratio
n, Cooking, Dressing, Eating, Fine Motor Skills, Grooming, Homemaking, Kitchen Mobility, Laundry, Pat
ient Education, Safety Awareness, Splinting - Positioning, Transfers(Toilet, Tub, Shower), and Wheel 
Chair Management

 Cognitive deficits - to improve, our occupation therapists will perform initial evaluation of pt's s
tatus upon admission and devise an individualized program for Cognition - orientation

 Need for care giver - to improve, our occupation therapists will perform initial evaluation of pt's 
status upon admission and devise an individualized program for Caregiver Training

 Weakness - to improve, our occupation therapists will perform initial evaluation of pt's status upon
 admission and devise an individualized program for Aquatic Therapy, Balance, Endurance, UE ROM, and 
UE strengthening

- Other

 See attached MAR (Medication Administration Record)

- Diet Type

Continue Regular

- Diet - Liquid Texture

Continue Regular

- Tube Feed

Continue N/A

- Diet - Solid Texture

Continue Regular

- Shower

 allowing shower



FUNCTIONAL STATUS: UPDATED AT WEEKLY TEAM CONFERENCE



- Bladder

Same accident frequency: 7-Ind - No accidents in the past 7 days

- Bowel

Same accident frequency: 7-Ind - No accidents in the past 7 days

- Walking

Same score based on distance walked: 0(N/A)

Same score based on distance walked: 1(<=50ft)

- Wheelchair

Same score based on distance traveled: 0(N/A)



FUNCTIONAL STATUS:



- Self-Care

A. Eating    Ind   

B. Grooming    Rafaela   

C. Bathing    maxA   

D. Dressing - Upper     Luis   

E. Dressing - Lower     maxA   

F. Toileting    Luis   

- Sphincter Control

G. Bladder control     Rafaela   

H. Bowel control     Rafaela   

- Transfers Control

I. Bed/Chair/Wheelchair     maxA   

J. Toilet    maxA   

K. Tub/Shower    maxA   

- Locomotion

L. Walk/Wheelchair (B)     modA   

M. Stairs    ADNO   

- Communication

N. Comprehension (B)     Rafaela   

O. Expression (B)     Rafaela   

- Social Cognition

P. Social Interaction    Rafaela   

Q. Problem Solving    sup   

R. Memory    sup   

- Endurance

Fair

- Balance

Fair

- Safety Awareness

Fair



QI SCORES:



- Self-Care

A. Eating  06-Independent  

B. Oral hygiene  04-Supervision or touching assistance  

C. Toileting hygiene  04-Supervision or touching assistance  

E. Shower/bathe self  03-Partial/moderate assistance  

F. Upper body dressing  03-Partial/moderate assistance  

G. Lower body dressing  03-Partial/moderate assistance  

H. Putting on/taking off footwear  03-Partial/moderate assistance  

- Mobility

A. Roll left and right  03-Partial/moderate assistance  

B. Sit to lying  03-Partial/moderate assistance  

C. Lying to sitting on side of bed  03-Partial/moderate assistance  

D. Sit to stand  03-Partial/moderate assistance  

E. Chair/bed-to-chair transfer  03-Partial/moderate assistance  

F. Toilet transfer  03-Partial/moderate assistance  

G. Car transfer  03-Partial/moderate assistance  

I. Walk 10 feet  03-Partial/moderate assistance  

J. Walk 50 feet with two turns  88-Not attempted due to medical condition or safety concerns  

K. Walk 150 feet  88-Not attempted due to medical condition or safety concerns  

L. Walking 10 feet on uneven surfaces  88-Not attempted due to medical condition or safety concerns  


M. 1 step (curb)  88-Not attempted due to medical condition or safety concerns  

N. 4 steps  88-Not attempted due to medical condition or safety concerns  

O. 12 steps  88-Not attempted due to medical condition or safety concerns  

P. Picking up object  88-Not attempted due to medical condition or safety concerns  

R. Wheel 50 feet with two turns  88-Not attempted due to medical condition or safety concerns  

S. Wheel 150 feet  88-Not attempted due to medical condition or safety concerns  

- Bladder and Bowel

Bladder continence  0-Always continent  

Bowel continence  0-Always continent  

- Endurance

Fair

- Balance

Fair

- Safety Awareness

Fair



CURRENT [facility]. DEFICITS:



Endurance, Balance, Mobility, Safety Awareness, and Self-Care



SIGNATURE PANEL:



Electronically signed by Dr. Edgar Dick M.D. on 08/25/2020 at 17:59 (CDT)

## 2020-08-25 NOTE — FAST
QUALITY INDICATORS FORM



SHIFT START DATE/TIME:



08/24/2020 19:00 (CDT)



SHIFT END DATE/TIME:



08/25/2020 07:00 (CDT)



NAME



HENRIETTA MATIAS



YOB: 1932



DATE OF ADMISSION:



08/24/2020 16:07 (CDT)



PHONE:



[phone]



AGE:



88



N#



[id number]



GENDER:



Female



ENCOUNTER PHYSICIAN:



Dr. Edgar Dick M.D.



ADMISSION DIAGNOSIS:



- Debility 16 -  Debility (16)

OSTEOPEROSIS. 



EATING:



Not assessed/no information 



CODE:



-  



ORAL HYGIENE:



ORAL HYGIENE - STEP 1:



Does the patient complete the activity by him/herself with no assistance (physical, verbal/nonverbal 
cueing, setup/clean-up)? No.



ORAL HYGIENE - STEP 2:



Does the patient need only setup/clean-up assistance from one helper? No.



ORAL HYGIENE - STEP 3:



Does the patient need only verbal/nonverbal cueing or touching/steadying/contact guard assistance fro
m one helper? Yes.



1. [id number] ADMISSION PERFORMANCE:



Supervision or touching assistance   



CODE:



04  



TOILETING HYGIENE:



TOILETING HYGIENE - STEP 1:



Does the patient complete the activity by him/herself with no assistance (physical, verbal/nonverbal 
cueing, setup/clean-up)? No.



TOILETING HYGIENE - STEP 2:



Does the patient need only setup/clean-up assistance from one helper? No.



TOILETING HYGIENE - STEP 3:



Does the patient need only verbal/nonverbal cueing or touching/steadying/contact guard assistance fro
m one helper? Yes.



1. [id number] ADMISSION PERFORMANCE:



Supervision or touching assistance   



CODE:



04  



BATHING:



Not assessed/no information 



CODE:



-  



DRESSING - UPPER BODY:



Not assessed/no information 



CODE:



-  



DRESSING - LOWER BODY:



Not assessed/no information 



CODE:



-  



PUTTING ON/TAKING OFF FOOTWEAR:



Not assessed/no information 



CODE:



-  



ROLL LEFT AND RIGHT:



ROLL LEFT AND RIGHT - STEP 1:



Does the patient complete the activity by him/herself with no assistance (physical, verbal/nonverbal 
cueing, setup/clean-up)? No.



ROLL LEFT AND RIGHT - STEP 2:



Does the patient need only setup/clean-up assistance from one helper? No.



ROLL LEFT AND RIGHT - STEP 3:



Does the patient need only verbal/nonverbal cueing or touching/steadying/contact guard assistance fro
m one helper? Yes.



1. [id number] ADMISSION PERFORMANCE:



Supervision or touching assistance   



CODE:



04  



SIT TO LYING:



SIT TO LYING - STEP 1:



Does the patient complete the activity by him/herself with no assistance (physical, verbal/nonverbal 
cueing, setup/clean-up)? No.



SIT TO LYING - STEP 2:



Does the patient need only setup/clean-up assistance from one helper? No.



SIT TO LYING - STEP 3:



Does the patient need only verbal/nonverbal cueing or touching/steadying/contact guard assistance fro
m one helper? Yes.



1. [id number] ADMISSION PERFORMANCE:



Supervision or touching assistance   



CODE:



04  



LYING TO SITTING:



LYING TO SITTING ON SIDE OF BED - STEP 1:



Does the patient complete the activity by him/herself with no assistance (physical, verbal/nonverbal 
cueing, setup/clean-up)? No.



LYING TO SITTING ON SIDE OF BED - STEP 2:



Does the patient need only setup/clean-up assistance from one helper? No.



LYING TO SITTING ON SIDE OF BED - STEP 3:



Does the patient need only verbal/nonverbal cueing or touching/steadying/contact guard assistance fro
m one helper? Yes.



1. [id number] ADMISSION PERFORMANCE:



Supervision or touching assistance   



CODE:



04  



SIT TO STAND:



SIT TO STAND - STEP 1:



Does the patient complete the activity by him/herself with no assistance (physical, verbal/nonverbal 
cueing, setup/clean-up)? No.



SIT TO STAND - STEP 2:



Does the patient need only setup/clean-up assistance from one helper? No.



SIT TO STAND - STEP 3:



Does the patient need only verbal/nonverbal cueing or touching/steadying/contact guard assistance fro
m one helper? Yes.



1. [id number] ADMISSION PERFORMANCE:



Supervision or touching assistance   



CODE:



04  



TRANSFERS: BED, CHAIR:



CHAIR/BED-TO-CHAIR TRANSFER - STEP 1:



Does the patient complete the activity by him/herself with no assistance (physical, verbal/nonverbal 
cueing, setup/clean-up)? No.



CHAIR/BED-TO-CHAIR TRANSFER - STEP 2:



Does the patient need only setup/clean-up assistance from one helper? No.



CHAIR/BED-TO-CHAIR TRANSFER - STEP 3:



Does the patient need only verbal/nonverbal cueing or touching/steadying/contact guard assistance fro
m one helper? Yes.



1. [id number] ADMISSION PERFORMANCE:



Supervision or touching assistance   



CODE:



04  



TRANSFER TOILET:



TOILET TRANSFER - STEP 1:



Does the patient complete the activity by him/herself with no assistance (physical, verbal/nonverbal 
cueing, setup/clean-up)? No.



TOILET TRANSFER - STEP 2:



Does the patient need only setup/clean-up assistance from one helper? No.



TOILET TRANSFER - STEP 3:



Does the patient need only verbal/nonverbal cueing or touching/steadying/contact guard assistance fro
m one helper? Yes.



1. [id number] ADMISSION PERFORMANCE:



Supervision or touching assistance   



CODE:



04  



TRANSFERS: CAR:



Not assessed/no information 



CODE:



-  



WALK 10 FEET:



Not assessed/no information 



CODE:



-   



1 STEP (CURB):



Not assessed/no information 



CODE:



-   



PICKING UP OBJECT:



Not assessed/no information 



CODE:



-  



DOES THE PATIENT USE A WHEELCHAIR/SCOOTER?



CODE:



EXPR  



WHEEL 50 FEET WITH TWO TURNS:



Not assessed/no information 



CODE:



-  



INDICATE THE TYPE OF WHEELCHAIR/SCOOTER USED:



CODE:



EXPR  



WHEEL 150 FEET:



Not assessed/no information 



CODE:



-  



INDICATE THE TYPE OF WHEELCHAIR/SCOOTER USED:



CODE:



EXPR  



BLADDER AND BOWEL:



H350. BLADDER CONTINENCE (3-DAY ASSESSMENT PERIOD):



Always continent (no documented incontinence)   



CODE:



0  



H400. BOWEL CONTINENCE (3-DAY ASSESSMENT PERIOD):



Always continent   



CODE:



0

## 2020-08-26 RX ADMIN — APIXABAN SCH MG: 2.5 TABLET, FILM COATED ORAL at 20:24

## 2020-08-26 RX ADMIN — EZETIMIBE SCH MG: 10 TABLET ORAL at 08:22

## 2020-08-26 RX ADMIN — MAGNESIUM OXIDE TAB 400 MG (241.3 MG ELEMENTAL MG) SCH MG: 400 (241.3 MG) TAB at 08:21

## 2020-08-26 RX ADMIN — TRAMADOL HYDROCHLORIDE PRN MG: 50 TABLET, COATED ORAL at 08:20

## 2020-08-26 RX ADMIN — MONTELUKAST SODIUM SCH MG: 10 TABLET, FILM COATED ORAL at 08:21

## 2020-08-26 RX ADMIN — LEVOTHYROXINE SODIUM SCH MG: 75 TABLET ORAL at 06:55

## 2020-08-26 RX ADMIN — LOSARTAN POTASSIUM AND HYDROCHLOROTHIAZIDE SCH TAB: 50; 12.5 TABLET, FILM COATED ORAL at 08:22

## 2020-08-26 RX ADMIN — APIXABAN SCH MG: 2.5 TABLET, FILM COATED ORAL at 08:22

## 2020-08-26 RX ADMIN — PREGABALIN SCH MG: 50 CAPSULE ORAL at 20:23

## 2020-08-26 RX ADMIN — Medication SCH MG: at 20:23

## 2020-08-26 RX ADMIN — PREGABALIN SCH MG: 50 CAPSULE ORAL at 08:22

## 2020-08-26 NOTE — R.PN
PROGRESS NOTES



ENCOUNTER DATE AND TIME:



08/26/2020 19:31 (CDT)



NAME



HENRIETTA MATIAS



YOB: 1932



DATE OF ADMISSION:



08/24/2020 16:07 (CDT)



OSTEOPEROSISCHIEF COMPLAINT:



Bilateral knee pain and weakness, osteoperosis



SUBJECTIVE:



Pt denied any depression.

Pt denied any Shortness of Breath.

Patient states that pain is under control.

WBC 6.3, Hgb 11.9, prealbumin 17.1, Ca 8.4, UA esterase trace, bacteria 20-50, COVID-19 negative.

ADLs done with min assistance to independence. Ambulated 250' x 3 with fair tolerance using a rolling
 walker.



VITAL SIGNS



Temperature: 98.0 F

SBP/DBP: 114/52

Pulse: 65

Resp: 16



MEDICATION ALLERGIES:



No Known Drug Allergies (NKDA)



ENVIRONMENTAL ALLERGIES:



- Substance Allergies

None Known

- Other Allergies

None Known



NURSING:



- Shower

allowing shower



ACTIVITIES



OOB only with supervision



THERAPIES:



- Dietary and Nutrition

Adequate Nutrition. Nutritional Education. Nutritional Supplements. 



PHYSICAL EXAM



- Gen

Oriented to: person, time, and place

- Vital Signs

Vital signs stable, afebrile

- Skin

No skin breakdown.



Normacephalic

- Eyes

No abnormalities

- Neck

No abnormalities

- CVS

RRR

- Chest

No abnormalities

- Resp

Clear to auscultation

- Abd

Soft

- GI

Non distended



Deferred

- 

No abnormalities

- Ext

No significant edema

- MSK

4/5 weakness in both lower extremities.

- Neuro

No focal deficits

- Psych

No abnormalities



ASSESSMENT:



Pt. is a 89 yo Right-handed female of unknown race.On 08/24/2020 she was admitted to [facility] with diagnosis OSTEOPEROSIS.Her impairment category is Debility 16 -  Debility (16).Pre-morbi
dly, Pt. was independent/mod-I in Safety Awareness, Social Cognition, Transfers Control, Sphincter Co
ntrol, and Endurance; and she had good Communication and Self-Care.Currently, she has deficits of Zahida
f-Care, Locomotion, Safety Awareness, Balance, Transfers Control, Social Cognition, and Sphincter Con
trol.Pt. is now referred to [facility] for acute in-patient rehabilitation in 
order to maximize patient's functional independence in activities of daily living, strength, ROM, and
 mobility.- Rehab Goal

Patient has realistic goal of being discharged at assistance level 6-Rafaela to reside at Home with  Pt 
self.

MDM/PLAN:



- Physical Therapy

 Gait dysfunction - to improve, our physical therapists will perform initial evaluation of pt's statu
s upon admission and devise an individualized program for Gait Training, and Wheel Chair mobility

 Inability to transfer - to improve, our physical therapists will perform initial evaluation of pt's 
status upon admission and devise an individualized program for Bed mobility

 Need for home safety evaluation - to improve, our physical therapists will perform initial evaluatio
n of pt's status upon admission and devise an individualized program for Home Evaluation

 Need in caregiver upon discharge - to improve, our physical therapists will perform initial evaluati
on of pt's status upon admission and devise an individualized program for Caregiver Training

 Edema - to improve, our physical therapists will perform initial evaluation of pt's status upon admi
ssion and devise an individualized program for Elevation Training, and Lymphedema Therapy

 New precaution - to improve, our physical therapists will perform initial evaluation of pt's status 
upon admission and devise an individualized program for Patient precaution education

 Poor balance - to improve, our physical therapists will perform initial evaluation of pt's status up
on admission and devise an individualized program for Balance Training

 Weakness - to improve, our physical therapists will perform initial evaluation of pt's status upon a
dmission and devise an individualized program for Aquatic Therapy, Neuromuscular Reeducation, and Str
engthening

 Achieving independence - to improve, our physical therapists will perform initial evaluation of pt's
 status upon admission and devise an individualized program for Community Reintegration Activities

- Occupational Therapy

 ADL deficits - to improve, our occupation therapists will perform initial evaluation of pt's status 
upon admission and devise an individualized program for Bathing, Bed mobility, Community Reintegratio
n, Cooking, Dressing, Eating, Fine Motor Skills, Grooming, Homemaking, Kitchen Mobility, Laundry, Pat
ient Education, Safety Awareness, Splinting - Positioning, Transfers(Toilet, Tub, Shower), and Wheel 
Chair Management

 Cognitive deficits - to improve, our occupation therapists will perform initial evaluation of pt's s
tatus upon admission and devise an individualized program for Cognition - orientation

 Need for care giver - to improve, our occupation therapists will perform initial evaluation of pt's 
status upon admission and devise an individualized program for Caregiver Training

 Weakness - to improve, our occupation therapists will perform initial evaluation of pt's status upon
 admission and devise an individualized program for Aquatic Therapy, Balance, Endurance, UE ROM, and 
UE strengthening

- Other

 See attached MAR (Medication Administration Record)

- Diet Type

Continue Regular

- Diet - Liquid Texture

Continue Regular

- Tube Feed

Continue N/A

- Diet - Solid Texture

Continue Regular

- Shower

 allowing shower



FUNCTIONAL STATUS: UPDATED AT WEEKLY TEAM CONFERENCE



- Bladder

Same accident frequency: 7-Ind - No accidents in the past 7 days

- Bowel

Same accident frequency: 7-Ind - No accidents in the past 7 days

- Walking

Same score based on distance walked: 0(N/A)

Same score based on distance walked: 1(<=50ft)

- Wheelchair

Same score based on distance traveled: 0(N/A)



FUNCTIONAL STATUS:



- Self-Care

A. Eating    Ind   

B. Grooming    Rafaela   

C. Bathing    maxA   

D. Dressing - Upper     Luis   

E. Dressing - Lower     maxA   

F. Toileting    Luis   

- Sphincter Control

G. Bladder control     Rafaela   

H. Bowel control     Rafaela   

- Transfers Control

I. Bed/Chair/Wheelchair     maxA   

J. Toilet    maxA   

K. Tub/Shower    maxA   

- Locomotion

L. Walk/Wheelchair (B)     modA   

M. Stairs    ADNO   

- Communication

N. Comprehension (B)     Rafaela   

O. Expression (B)     Rafaela   

- Social Cognition

P. Social Interaction    Rafaela   

Q. Problem Solving    sup   

R. Memory    sup   

- Endurance

Fair

- Balance

Fair

- Safety Awareness

Fair



QI SCORES:



- Self-Care

A. Eating  06-Independent  

B. Oral hygiene  04-Supervision or touching assistance  

C. Toileting hygiene  04-Supervision or touching assistance  

E. Shower/bathe self  03-Partial/moderate assistance  

F. Upper body dressing  03-Partial/moderate assistance  

G. Lower body dressing  03-Partial/moderate assistance  

H. Putting on/taking off footwear  03-Partial/moderate assistance  

- Mobility

A. Roll left and right  03-Partial/moderate assistance  

B. Sit to lying  03-Partial/moderate assistance  

C. Lying to sitting on side of bed  03-Partial/moderate assistance  

D. Sit to stand  03-Partial/moderate assistance  

E. Chair/bed-to-chair transfer  03-Partial/moderate assistance  

F. Toilet transfer  03-Partial/moderate assistance  

G. Car transfer  03-Partial/moderate assistance  

I. Walk 10 feet  03-Partial/moderate assistance  

J. Walk 50 feet with two turns  88-Not attempted due to medical condition or safety concerns  

K. Walk 150 feet  88-Not attempted due to medical condition or safety concerns  

L. Walking 10 feet on uneven surfaces  88-Not attempted due to medical condition or safety concerns  


M. 1 step (curb)  88-Not attempted due to medical condition or safety concerns  

N. 4 steps  88-Not attempted due to medical condition or safety concerns  

O. 12 steps  88-Not attempted due to medical condition or safety concerns  

P. Picking up object  88-Not attempted due to medical condition or safety concerns  

R. Wheel 50 feet with two turns  88-Not attempted due to medical condition or safety concerns  

S. Wheel 150 feet  88-Not attempted due to medical condition or safety concerns  

- Bladder and Bowel

Bladder continence  0-Always continent  

Bowel continence  0-Always continent  

- Endurance

Fair

- Balance

Fair

- Safety Awareness

Fair



[facility]:



Endurance, Balance, Mobility, Safety Awareness, and Self-Care



SIGNATURE PANEL:



Electronically signed by Dr. Edgar Dick M.D. on 08/26/2020 at 19:32 (CDT)

## 2020-08-26 NOTE — P.PN
Subjective


Date of Service: 08/26/20


Chief Complaint: WEAK AFTER FALL AT HOME.


Subjective: Improving





SHE IS  GETTING READY  FOR PT.





Review of Systems


10-point ROS is otherwise unremarkable





Physical Examination





- Vital Signs


Temperature: 96.9 F


Blood Pressure: 122/57


Pulse: 88


Respirations: 16


Pulse Ox (%): 97





- Physical Exam


General: Mild distress


HEENT: Atraumatic, PERRLA, EOMI


Neck: Supple, JVD not distended


Respiratory: Clear to auscultation bilaterally, Normal air movement


Cardiovascular: Regular rate/rhythm, Normal S1 S2


Gastrointestinal: Normal bowel sounds, No tenderness


Musculoskeletal: No tenderness


Integumentary: No rashes


Neurological: Abnormal strength (R LEG WEAK SINCE HIP SURGERY.)


Lymphatics: No axilla or inguinal lymphadenopathy





- Studies


Microbiology Data (last 24 hrs): 








08/24/20 15:00   Clean Catch Urine   [place] Count - Final


                            BETWEEN 10,000 & 100,000 CFU/ML


08/24/20 15:00   Clean Catch Urine    - Final


                            MIXED BROOKE.





Medications List Reviewed: Yes





Assessment And Plan





- Current Problems (Diagnosis)


(1) General weakness


Current Visit: Yes   Status: Chronic   


Plan: 


WORSE LATELY. 


CONT PT. 


DAILY VISIT. 


DIETARY CONSULT. 








(2) History of hip fracture


Current Visit: Yes   Status: Acute

## 2020-08-27 LAB
ALBUMIN SERPL BCP-MCNC: 3.2 G/DL (ref 3.4–5)
BUN BLD-MCNC: 20 MG/DL (ref 7–18)
GLUCOSE SERPLBLD-MCNC: 83 MG/DL (ref 74–106)
HCT VFR BLD CALC: 36.3 % (ref 36–45)
LYMPHOCYTES # SPEC AUTO: 3 K/UL (ref 0.7–4.9)
PMV BLD: 7.5 FL (ref 7.6–11.3)
POTASSIUM SERPL-SCNC: 4.8 MMOL/L (ref 3.5–5.1)
PREALB SERPL-MCNC: 15.1 MG/DL (ref 20–40)
RBC # BLD: 3.96 M/UL (ref 3.86–4.86)

## 2020-08-27 RX ADMIN — MAGNESIUM OXIDE TAB 400 MG (241.3 MG ELEMENTAL MG) SCH MG: 400 (241.3 MG) TAB at 08:15

## 2020-08-27 RX ADMIN — PREGABALIN SCH MG: 50 CAPSULE ORAL at 08:15

## 2020-08-27 RX ADMIN — PREGABALIN SCH MG: 50 CAPSULE ORAL at 19:33

## 2020-08-27 RX ADMIN — APIXABAN SCH MG: 2.5 TABLET, FILM COATED ORAL at 08:15

## 2020-08-27 RX ADMIN — Medication SCH MG: at 19:34

## 2020-08-27 RX ADMIN — MONTELUKAST SODIUM SCH MG: 10 TABLET, FILM COATED ORAL at 08:15

## 2020-08-27 RX ADMIN — LEVOTHYROXINE SODIUM SCH MG: 75 TABLET ORAL at 06:47

## 2020-08-27 RX ADMIN — EZETIMIBE SCH MG: 10 TABLET ORAL at 08:15

## 2020-08-27 RX ADMIN — TRAMADOL HYDROCHLORIDE PRN MG: 50 TABLET, COATED ORAL at 08:13

## 2020-08-27 RX ADMIN — LOSARTAN POTASSIUM AND HYDROCHLOROTHIAZIDE SCH TAB: 50; 12.5 TABLET, FILM COATED ORAL at 08:14

## 2020-08-27 RX ADMIN — MELATONIN PRN MG: 3 TAB ORAL at 19:33

## 2020-08-27 RX ADMIN — APIXABAN SCH MG: 2.5 TABLET, FILM COATED ORAL at 19:33

## 2020-08-27 NOTE — PN
Subjective:  The patient is doing lot better.  Denies chest pain, nausea, vomiting.



Objective:  Vital Signs:  Blood pressure 120/57, pulse is 87, temperature 97.0, pulse ox 93%.  

HEENT:  No JVD.  No carotid bruits.  Chest:  Clear. 

Heart:  Regular.



Assessment And Planning:  The patient is cachectic, frail, weak, generally.  She is high at a fall ri
sk.  Continue OT and PT.  She has balance exercises at home, but she is not doing so.  I am advising 
her again to start doing those kind of exercises.





MORGAN/MEG

DD:  08/27/2020 11:35:31Voice ID:  [id number]

DT:  08/27/2020 11:55:42Report ID:  [id number]

## 2020-08-28 RX ADMIN — MELATONIN PRN MG: 3 TAB ORAL at 21:01

## 2020-08-28 RX ADMIN — MONTELUKAST SODIUM SCH MG: 10 TABLET, FILM COATED ORAL at 08:43

## 2020-08-28 RX ADMIN — Medication SCH PATCH: at 17:00

## 2020-08-28 RX ADMIN — Medication SCH MG: at 21:00

## 2020-08-28 RX ADMIN — MAGNESIUM OXIDE TAB 400 MG (241.3 MG ELEMENTAL MG) SCH MG: 400 (241.3 MG) TAB at 20:59

## 2020-08-28 RX ADMIN — MAGNESIUM OXIDE TAB 400 MG (241.3 MG ELEMENTAL MG) SCH: 400 (241.3 MG) TAB at 08:00

## 2020-08-28 RX ADMIN — PREGABALIN SCH MG: 50 CAPSULE ORAL at 20:59

## 2020-08-28 RX ADMIN — PREGABALIN SCH MG: 50 CAPSULE ORAL at 08:43

## 2020-08-28 RX ADMIN — MAGNESIUM OXIDE TAB 400 MG (241.3 MG ELEMENTAL MG) SCH MG: 400 (241.3 MG) TAB at 08:43

## 2020-08-28 RX ADMIN — LOSARTAN POTASSIUM AND HYDROCHLOROTHIAZIDE SCH TAB: 50; 12.5 TABLET, FILM COATED ORAL at 08:44

## 2020-08-28 RX ADMIN — TRAMADOL HYDROCHLORIDE PRN MG: 50 TABLET, COATED ORAL at 08:43

## 2020-08-28 RX ADMIN — LEVOTHYROXINE SODIUM SCH MG: 75 TABLET ORAL at 05:39

## 2020-08-28 RX ADMIN — APIXABAN SCH MG: 2.5 TABLET, FILM COATED ORAL at 20:59

## 2020-08-28 RX ADMIN — EZETIMIBE SCH MG: 10 TABLET ORAL at 08:43

## 2020-08-28 RX ADMIN — APIXABAN SCH MG: 2.5 TABLET, FILM COATED ORAL at 08:44

## 2020-08-28 NOTE — P.RH.PN
Estimated Length of Stay: 12


Expected Discharge Date: 09/04/20


Discharge Disposition Plan: Home


Family Support: Yes


Long Term Goal: Mobility, Transfers, Self Care


Vital Signs: 


                                Last Vital Signs











Temp  97.1 F   08/28/20 09:50


 


Pulse  67   08/28/20 09:50


 


Resp  16   08/28/20 09:50


 


BP  118/55 L  08/28/20 09:50


 


Pulse Ox  95   08/28/20 09:50











Laboratory: 


                             Laboratory Last Values











WBC  6.9 K/uL (4.3-10.9)   08/27/20  06:18    


 


RBC  3.96 M/uL (3.86-4.86)   08/27/20  06:18    


 


Hgb  12.3 g/dL (12.0-15.0)   08/27/20  06:18    


 


Hct  36.3 % (36.0-45.0)   08/27/20  06:18    


 


MCV  91.6 fL ()   08/27/20  06:18    


 


MCH  31.1 pg (27.0-35.0)   08/27/20  06:18    


 


MCHC  33.9 g/dL (32.0-36.0)   08/27/20  06:18    


 


RDW  13.4 % (12.1-15.2)   08/27/20  06:18    


 


Plt Count  299 K/uL (152-406)   08/27/20  06:18    


 


MPV  7.5 fL (7.6-11.3)  L  08/27/20  06:18    


 


Neutrophils %  42.6 % (41.7-73.7)   08/27/20  06:18    


 


Lymphocytes %  44.2 % (15.3-44.8)   08/27/20  06:18    


 


Monocytes %  10.1 % (3.3-12.3)   08/27/20  06:18    


 


Eosinophils %  2.1 % (0-4.4)   08/27/20  06:18    


 


Basophils %  1.0 % (0-1.3)   08/27/20  06:18    


 


Absolute Neutrophils  2.9 K/uL (1.8-8.0)   08/27/20  06:18    


 


Absolute Lymphocytes  3.0 K/uL (0.7-4.9)   08/27/20  06:18    


 


Absolute Monocytes  0.7 K/uL (0.1-1.3)   08/27/20  06:18    


 


Absolute Eosinophils  0.1 K/uL (0-0.5)   08/27/20  06:18    


 


Absolute Basophils  0.1 K/uL (0-0.5)   08/27/20  06:18    


 


Sodium  137 mmol/L (136-145)   08/27/20  06:18    


 


Potassium  4.8 mmol/L (3.5-5.1)   08/27/20  06:18    


 


Chloride  103 mmol/L ()   08/27/20  06:18    


 


Carbon Dioxide  32 mmol/L (21-32)   08/27/20  06:18    


 


BUN  20 mg/dL (7-18)  H  08/27/20  06:18    


 


Creatinine  0.70 mg/dL (0.55-1.3)   08/27/20  06:18    


 


Estimated GFR  79 mL/min (=/>90)  L  08/27/20  06:18    


 


Glucose  83 mg/dL ()   08/27/20  06:18    


 


Calcium  8.7 mg/dL (8.5-10.1)   08/27/20  06:18    


 


Magnesium  2.2 mg/dL (1.8-2.4)   08/25/20  05:59    


 


Albumin  3.2 g/dL (3.4-5.0)  L  08/27/20  06:18    


 


Prealbumin  15.1 mg/dL (20-40)  L  08/27/20  06:18    


 


Urine Color  Yellow   08/24/20  15:00    


 


Urine Appearance  Clear   08/24/20  15:00    


 


Urine pH  6.5  (5.0-7.0)   08/24/20  15:00    


 


Ur Specific Gravity  1.010  (1.005-1.030)   08/24/20  15:00    


 


Glucose (UA)(Auto)  Negative  (NEG)   08/24/20  15:00    


 


Urine Ketones  Negative  (NEG)   08/24/20  15:00    


 


Urine Blood  Trace  (NEG)  H  08/24/20  15:00    


 


Urine Nitrite  Negative  (NEG)   08/24/20  15:00    


 


Urine Bilirubin  Negative  (NEG)   08/24/20  15:00    


 


Urine Urobilinogen  0.2 mg/dL (0.2-1.0)   08/24/20  15:00    


 


Ur Leukocyte Esterase  Trace  (NEG)  H  08/24/20  15:00    


 


Urine RBC  <5 /HPF (NONE SEEN)   08/24/20  15:00    


 


Urine WBC  5-10 /HPF (<5)  H  08/24/20  15:00    


 


Ur Squamous Epith Cells  5-10 /HPF (NONE SEEN)  H  08/24/20  15:00    


 


Urine Bacteria  20-50 /HPF (<20)  H  08/24/20  15:00    


 


Urine Culture Reflexed  Not needed   08/24/20  15:00    


 


Urine Total Protein  Negative  (NEG)   08/24/20  15:00    


 


SARS-CoV-2 RNA (RT-PCR)  Negative  (NEGATIVE)   08/25/20  14:50    











Weight: 143 lb 14.4 oz


Wound Present: No


Closed Surgical Incision Present: No


Negative Pressure Wound Therapy Present: No


Physician Update: Labs reviwed and are stable. She is at standby assistance to 

independent with transfers. She has short term memory difficulties but works 

well with speech therapy. Will cut Lyrica to 50 mg twice daily from 100 mg twice

daily and add pain patches to the hips as needed.


Summary: Patient's care plan and long term goals have been reviewed and revised 

as necessary. Please see the Rehabilitation Signature page for all necessary 

signatures.

## 2020-08-28 NOTE — P.PN
Subjective


Date of Service: 08/28/20


Chief Complaint: WEAK AFTER FALL AT HOME.


Subjective: Improving





SHE IS  GETTING READY  FOR PT.





SHE IS STABLE . HAS NO PAIN. 


THINKING ABOUT HOME HEALTH. 





Review of Systems


10-point ROS is otherwise unremarkable


General: Weakness





Physical Examination





- Vital Signs


Temperature: 97.1 F


Blood Pressure: 118/55


Pulse: 67


Respirations: 16


Pulse Ox (%): 95





- Physical Exam


General: Cachectic, Mild distress


HEENT: Atraumatic, PERRLA, EOMI


Neck: Supple, JVD not distended


Respiratory: Clear to auscultation bilaterally, Normal air movement


Cardiovascular: Regular rate/rhythm, Normal S1 S2


Gastrointestinal: Normal bowel sounds, No tenderness


Musculoskeletal: No tenderness, Other (RIGHT LEG IS ABOUT INCH LONGER SINCE HIP 

SURGERY.)


Integumentary: No rashes


Neurological: Normal speech, Normal tone, Normal affect


Lymphatics: No axilla or inguinal lymphadenopathy





- Studies


Medications List Reviewed: Yes





Assessment And Plan





- Current Problems (Diagnosis)


(1) General weakness


Current Visit: Yes   Status: Chronic   


Plan: 


WORSE LATELY. 


CONT PT. 


DAILY VISIT. 


DIETARY CONSULT. 








(2) History of hip fracture


Current Visit: Yes   Status: Acute   


Plan: 


CONT OT AND PT

## 2020-08-29 RX ADMIN — MAGNESIUM OXIDE TAB 400 MG (241.3 MG ELEMENTAL MG) SCH MG: 400 (241.3 MG) TAB at 20:37

## 2020-08-29 RX ADMIN — MONTELUKAST SODIUM SCH MG: 10 TABLET, FILM COATED ORAL at 08:32

## 2020-08-29 RX ADMIN — LEVOTHYROXINE SODIUM SCH MG: 75 TABLET ORAL at 06:36

## 2020-08-29 RX ADMIN — PREGABALIN SCH MG: 50 CAPSULE ORAL at 20:37

## 2020-08-29 RX ADMIN — TRAMADOL HYDROCHLORIDE PRN MG: 50 TABLET, COATED ORAL at 08:31

## 2020-08-29 RX ADMIN — PANTOPRAZOLE SODIUM SCH MG: 40 TABLET, DELAYED RELEASE ORAL at 10:06

## 2020-08-29 RX ADMIN — PREGABALIN SCH MG: 50 CAPSULE ORAL at 08:32

## 2020-08-29 RX ADMIN — APIXABAN SCH MG: 2.5 TABLET, FILM COATED ORAL at 08:32

## 2020-08-29 RX ADMIN — APIXABAN SCH MG: 2.5 TABLET, FILM COATED ORAL at 20:37

## 2020-08-29 RX ADMIN — Medication SCH MG: at 20:41

## 2020-08-29 RX ADMIN — Medication SCH PATCH: at 08:29

## 2020-08-29 RX ADMIN — EZETIMIBE SCH MG: 10 TABLET ORAL at 08:32

## 2020-08-29 RX ADMIN — LOSARTAN POTASSIUM AND HYDROCHLOROTHIAZIDE SCH: 50; 12.5 TABLET, FILM COATED ORAL at 08:00

## 2020-08-29 NOTE — P.PN
Subjective


Date of Service: 08/29/20


Chief Complaint: WEAK AFTER FALL AT HOME.


Subjective: Improving





SHE IS  GETTING READY  FOR PT.





SHE IS STABLE . HAS NO PAIN. 


THINKING ABOUT HOME HEALTH. 





SHE IS IN SHOWER TODAY.


TALKED TO THERAPIST


SHE IS WALKING BETTER BUT STILL NOT STABLE AND TENDS TO HURRY.





Physical Examination





- Vital Signs


Temperature: 98.4 F


Blood Pressure: 102/47


Pulse: 64


Respirations: 16


Pulse Ox (%): 93





- Studies


Medications List Reviewed: Yes





Assessment And Plan





- Current Problems (Diagnosis)


(1) General weakness


Current Visit: Yes   Status: Chronic   


Plan: 


WORSE LATELY. 


CONT PT. 


DAILY VISIT. 


DIETARY CONSULT. 








(2) History of hip fracture


Current Visit: Yes   Status: Acute   


Plan: 


CONT OT AND PT

## 2020-08-30 RX ADMIN — EZETIMIBE SCH MG: 10 TABLET ORAL at 08:29

## 2020-08-30 RX ADMIN — APIXABAN SCH MG: 2.5 TABLET, FILM COATED ORAL at 08:29

## 2020-08-30 RX ADMIN — LEVOTHYROXINE SODIUM SCH MG: 75 TABLET ORAL at 05:35

## 2020-08-30 RX ADMIN — MONTELUKAST SODIUM SCH MG: 10 TABLET, FILM COATED ORAL at 08:29

## 2020-08-30 RX ADMIN — PREGABALIN SCH MG: 50 CAPSULE ORAL at 08:29

## 2020-08-30 RX ADMIN — PREGABALIN SCH MG: 50 CAPSULE ORAL at 20:21

## 2020-08-30 RX ADMIN — APIXABAN SCH MG: 2.5 TABLET, FILM COATED ORAL at 20:21

## 2020-08-30 RX ADMIN — ACETAMINOPHEN PRN MG: 500 TABLET, FILM COATED ORAL at 10:08

## 2020-08-30 RX ADMIN — MAGNESIUM OXIDE TAB 400 MG (241.3 MG ELEMENTAL MG) SCH MG: 400 (241.3 MG) TAB at 20:21

## 2020-08-30 RX ADMIN — LOSARTAN POTASSIUM AND HYDROCHLOROTHIAZIDE SCH: 50; 12.5 TABLET, FILM COATED ORAL at 08:00

## 2020-08-30 RX ADMIN — TRAMADOL HYDROCHLORIDE PRN MG: 50 TABLET, COATED ORAL at 22:04

## 2020-08-30 RX ADMIN — TIZANIDINE SCH MG: 4 TABLET ORAL at 20:21

## 2020-08-30 RX ADMIN — Medication SCH PATCH: at 08:28

## 2020-08-30 RX ADMIN — Medication SCH MG: at 20:22

## 2020-08-30 RX ADMIN — PANTOPRAZOLE SODIUM SCH MG: 40 TABLET, DELAYED RELEASE ORAL at 08:32

## 2020-08-30 RX ADMIN — Medication SCH MG: at 20:21

## 2020-08-30 NOTE — P.PN
Subjective


Date of Service: 08/30/20


Chief Complaint: BACK PAIN


Subjective: Worsening





SHE IS  GETTING READY  FOR PT.





SHE IS STABLE . HAS NO PAIN. 


THINKING ABOUT HOME HEALTH. 





SHE IS IN SHOWER TODAY.


TALKED TO THERAPIST


SHE IS WALKING BETTER BUT STILL NOT STABLE AND TENDS TO HURRY.





MS. MATIAS HAS BACK PAIN R SIDE LOWER BACK. 


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   WE NEED TO FU ON THIS AGAIN AS SHE FELL

RECENTLY. 





Physical Examination





- Vital Signs


Temperature: 97.5 F


Blood Pressure: 113/53


Pulse: 62


Respirations: 16


Pulse Ox (%): 94





- Physical Exam


General: Alert, In no apparent distress, Cachectic, Mild distress, Moderate 

distress


HEENT: Atraumatic, PERRLA, EOMI


Neck: Supple, JVD not distended


Respiratory: Clear to auscultation bilaterally, Normal air movement


Cardiovascular: Regular rate/rhythm, Normal S1 S2


Gastrointestinal: Normal bowel sounds, No tenderness


Musculoskeletal: No tenderness


Integumentary: No rashes


Neurological: Normal speech, Normal tone, Normal affect


Lymphatics: No axilla or inguinal lymphadenopathy





- Studies


Medications List Reviewed: Yes





Assessment And Plan





- Current Problems (Diagnosis)


(1) General weakness


Current Visit: Yes   Status: Chronic   


Plan: 


WORSE LATELY. 


CONT PT. 


DAILY VISIT. 


DIETARY CONSULT. 








(2) History of hip fracture


Current Visit: Yes   Status: Acute   


Plan: 


CONT OT AND PT








(3) Back pain


Current Visit: Yes   Status: Chronic   


Plan: 


ACUTE ON CHRONIC. 


MRI LS AGAIN .


SHE IS ON LYRICA AND REFUSES PAIN MEDS. 


SHE ALSO HAS LIDOCAIN PATCH. 


ADD ZANAFLEX.


Qualifiers: 


   Back pain location: low back pain   Chronicity: chronic   Back pain 

laterality: right   Sciatica presence: with sciatica   Sciatica laterality: 

sciatica of right side   Qualified Code(s): M54.41 - Lumbago with sciatica, 

right side; G89.29 - Other chronic pain

## 2020-08-31 RX ADMIN — EZETIMIBE SCH MG: 10 TABLET ORAL at 07:58

## 2020-08-31 RX ADMIN — TIZANIDINE SCH MG: 4 TABLET ORAL at 19:33

## 2020-08-31 RX ADMIN — Medication SCH MG: at 07:56

## 2020-08-31 RX ADMIN — TRAMADOL HYDROCHLORIDE PRN MG: 50 TABLET, COATED ORAL at 07:57

## 2020-08-31 RX ADMIN — APIXABAN SCH MG: 2.5 TABLET, FILM COATED ORAL at 07:58

## 2020-08-31 RX ADMIN — MAGNESIUM OXIDE TAB 400 MG (241.3 MG ELEMENTAL MG) SCH MG: 400 (241.3 MG) TAB at 19:33

## 2020-08-31 RX ADMIN — PREGABALIN SCH MG: 50 CAPSULE ORAL at 19:32

## 2020-08-31 RX ADMIN — Medication SCH MG: at 19:32

## 2020-08-31 RX ADMIN — Medication SCH PATCH: at 07:14

## 2020-08-31 RX ADMIN — MONTELUKAST SODIUM SCH MG: 10 TABLET, FILM COATED ORAL at 07:58

## 2020-08-31 RX ADMIN — Medication SCH MG: at 19:33

## 2020-08-31 RX ADMIN — LOSARTAN POTASSIUM AND HYDROCHLOROTHIAZIDE SCH: 50; 12.5 TABLET, FILM COATED ORAL at 08:00

## 2020-08-31 RX ADMIN — PANTOPRAZOLE SODIUM SCH MG: 40 TABLET, DELAYED RELEASE ORAL at 07:14

## 2020-08-31 RX ADMIN — TRAMADOL HYDROCHLORIDE PRN MG: 50 TABLET, COATED ORAL at 13:32

## 2020-08-31 RX ADMIN — PREGABALIN SCH MG: 50 CAPSULE ORAL at 07:57

## 2020-08-31 RX ADMIN — TIZANIDINE SCH MG: 4 TABLET ORAL at 07:58

## 2020-08-31 RX ADMIN — APIXABAN SCH MG: 2.5 TABLET, FILM COATED ORAL at 19:32

## 2020-08-31 RX ADMIN — LEVOTHYROXINE SODIUM SCH MG: 75 TABLET ORAL at 06:30

## 2020-08-31 NOTE — P.PN
Subjective


Date of Service: 08/31/20


Chief Complaint: BACK PAIN


Subjective: Improving





SHE IS  GETTING READY  FOR PT.





SHE IS STABLE . HAS NO PAIN. 


THINKING ABOUT HOME HEALTH. 





SHE IS IN SHOWER TODAY.


TALKED TO THERAPIST


SHE IS WALKING BETTER BUT STILL NOT STABLE AND TENDS TO HURRY.





MS. MATIAS HAS BACK PAIN R SIDE LOWER BACK. 


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   WE NEED TO FU ON THIS AGAIN AS SHE FELL

RECENTLY. 





SHE IS SOMEWHAT BETTER. 


SHE HAS NO CHEST PAIN.





Review of Systems


10-point ROS is otherwise unremarkable





Physical Examination





- Vital Signs


Temperature: 97.6 F


Blood Pressure: 115/53


Pulse: 63


Respirations: 16


Pulse Ox (%): 97





- Physical Exam


General: Mild distress


HEENT: Atraumatic, PERRLA, EOMI


Neck: Supple, JVD not distended


Respiratory: Clear to auscultation bilaterally, Normal air movement


Cardiovascular: Regular rate/rhythm, Normal S1 S2


Gastrointestinal: Normal bowel sounds, No tenderness


Musculoskeletal: No tenderness


Integumentary: No rashes


Neurological: Normal speech, Normal tone, Normal affect


Lymphatics: No axilla or inguinal lymphadenopathy





- Studies


Medications List Reviewed: Yes





Assessment And Plan





- Current Problems (Diagnosis)


(1) General weakness


Current Visit: Yes   Status: Chronic   


Plan: 


WORSE LATELY. 


CONT PT. 


DAILY VISIT. 


DIETARY CONSULT. 








(2) History of hip fracture


Current Visit: Yes   Status: Acute   


Plan: 


CONT OT AND PT





WANTS TO GO HOME SOON.








(3) Back pain


Current Visit: Yes   Status: Chronic   


Plan: 


ACUTE ON CHRONIC. 


MRI LS AGAIN .


SHE IS ON LYRICA AND REFUSES PAIN MEDS. 


SHE ALSO HAS LIDOCAIN PATCH. 


ADD ZANAFLEX.


Qualifiers: 


   Back pain location: low back pain   Chronicity: chronic   Back pain 

laterality: right   Sciatica presence: with sciatica   Sciatica laterality: 

sciatica of right side   Qualified Code(s): M54.41 - Lumbago with sciatica, 

right side; G89.29 - Other chronic pain

## 2020-08-31 NOTE — R.PN
PROGRESS NOTES



ENCOUNTER DATE AND TIME:



08/31/2020 18:01 (CDT)



NAME



HENRIETTA MATIAS



YOB: 1932



DATE OF ADMISSION:



08/24/2020 16:07 (CDT)



OSTEOPEROSISCHIEF COMPLAINT:



Bilateral knee pain and weakness, osteoperosis



SUBJECTIVE:



Pt denied any depression.

Pt denied any Shortness of Breath.

Patient states that pain is under control.

WBC 6.3, Hgb 11.9, prealbumin 17.1, Ca 8.4, UA esterase trace, bacteria 20-50, COVID-19 negative.

ADLs done with min assistance to independence. Ambulated 1500' with good tolerance using a rolling wa
lker.



VITAL SIGNS



Temperature: 97.6 F

SBP/DBP: 115/53

Pulse: 63

Resp: 18



MEDICATION ALLERGIES:



No Known Drug Allergies (NKDA)



ENVIRONMENTAL ALLERGIES:



- Substance Allergies

None Known

- Other Allergies

None Known



NURSING:



- Shower

allowing shower



ACTIVITIES



OOB only with supervision



THERAPIES:



- Dietary and Nutrition

Adequate Nutrition. Nutritional Education. Nutritional Supplements. 



PHYSICAL EXAM



- Gen

Oriented to: person, time, and place

- Vital Signs

Vital signs stable, afebrile

- Skin

No skin breakdown.



Normacephalic

- Eyes

No abnormalities

- Neck

No abnormalities

- CVS

RRR

- Chest

No abnormalities

- Resp

Clear to auscultation

- Abd

Soft

- GI

Non distended



Deferred

- 

No abnormalities

- Ext

No significant edema

- MSK

4/5 weakness in both lower extremities.

- Neuro

No focal deficits

- Psych

No abnormalities



ASSESSMENT:



Pt. is a 87 yo Right-handed female of unknown race.On 08/24/2020 she was admitted to [facility] with diagnosis OSTEOPEROSIS.Her impairment category is Debility 16 -  Debility (16).Pre-morbi
dly, Pt. was independent/mod-I in Safety Awareness, Social Cognition, Transfers Control, Sphincter Co
ntrol, and Endurance; and she had good Communication and Self-Care.Currently, she has deficits of Zahida
f-Care, Locomotion, Safety Awareness, Balance, Transfers Control, Social Cognition, and Sphincter Con
trol.Pt. is now referred to [facility] for acute in-patient rehabilitation in 
order to maximize patient's functional independence in activities of daily living, strength, ROM, and
 mobility.- Rehab Goal

Patient has realistic goal of being discharged at assistance level 6-Rafaela to reside at Home with  Pt 
self.

MDM/PLAN:



- Physical Therapy

 Gait dysfunction - to improve, our physical therapists will perform initial evaluation of pt's statu
s upon admission and devise an individualized program for Gait Training, and Wheel Chair mobility

 Inability to transfer - to improve, our physical therapists will perform initial evaluation of pt's 
status upon admission and devise an individualized program for Bed mobility

 Need for home safety evaluation - to improve, our physical therapists will perform initial evaluatio
n of pt's status upon admission and devise an individualized program for Home Evaluation

 Need in caregiver upon discharge - to improve, our physical therapists will perform initial evaluati
on of pt's status upon admission and devise an individualized program for Caregiver Training

 Edema - to improve, our physical therapists will perform initial evaluation of pt's status upon admi
ssion and devise an individualized program for Elevation Training, and Lymphedema Therapy

 New precaution - to improve, our physical therapists will perform initial evaluation of pt's status 
upon admission and devise an individualized program for Patient precaution education

 Poor balance - to improve, our physical therapists will perform initial evaluation of pt's status up
on admission and devise an individualized program for Balance Training

 Weakness - to improve, our physical therapists will perform initial evaluation of pt's status upon a
dmission and devise an individualized program for Aquatic Therapy, Neuromuscular Reeducation, and Str
engthening

 Achieving independence - to improve, our physical therapists will perform initial evaluation of pt's
 status upon admission and devise an individualized program for Community Reintegration Activities

- Occupational Therapy

 ADL deficits - to improve, our occupation therapists will perform initial evaluation of pt's status 
upon admission and devise an individualized program for Bathing, Bed mobility, Community Reintegratio
n, Cooking, Dressing, Eating, Fine Motor Skills, Grooming, Homemaking, Kitchen Mobility, Laundry, Pat
ient Education, Safety Awareness, Splinting - Positioning, Transfers(Toilet, Tub, Shower), and Wheel 
Chair Management

 Cognitive deficits - to improve, our occupation therapists will perform initial evaluation of pt's s
tatus upon admission and devise an individualized program for Cognition - orientation

 Need for care giver - to improve, our occupation therapists will perform initial evaluation of pt's 
status upon admission and devise an individualized program for Caregiver Training

 Weakness - to improve, our occupation therapists will perform initial evaluation of pt's status upon
 admission and devise an individualized program for Aquatic Therapy, Balance, Endurance, UE ROM, and 
UE strengthening

- Other

 See attached MAR (Medication Administration Record)

- Diet Type

Continue Regular

- Diet - Liquid Texture

Continue Regular

- Tube Feed

Continue N/A

- Diet - Solid Texture

Continue Regular

- Shower

 allowing shower



FUNCTIONAL STATUS: UPDATED AT WEEKLY TEAM CONFERENCE



- Bladder

Same accident frequency: 7-Ind - No accidents in the past 7 days

- Bowel

Same accident frequency: 7-Ind - No accidents in the past 7 days

- Walking

Same score based on distance walked: 0(N/A)

Same score based on distance walked: 1(<=50ft)

- Wheelchair

Same score based on distance traveled: 0(N/A)



FUNCTIONAL STATUS:



- Self-Care

A. Eating    Ind   

B. Grooming    Rafaela   

C. Bathing    maxA   

D. Dressing - Upper     Luis   

E. Dressing - Lower     maxA   

F. Toileting    Luis   

- Sphincter Control

G. Bladder control     Rafaela   

H. Bowel control     Rafaela   

- Transfers Control

I. Bed/Chair/Wheelchair     maxA   

J. Toilet    maxA   

K. Tub/Shower    maxA   

- Locomotion

L. Walk/Wheelchair (B)     modA   

M. Stairs    ADNO   

- Communication

N. Comprehension (B)     Rafaela   

O. Expression (B)     Rafaela   

- Social Cognition

P. Social Interaction    Rafaela   

Q. Problem Solving    sup   

R. Memory    sup   

- Endurance

Fair

- Balance

Fair

- Safety Awareness

Fair



QI SCORES:



- Self-Care

A. Eating  06-Independent  

B. Oral hygiene  04-Supervision or touching assistance  

C. Toileting hygiene  04-Supervision or touching assistance  

E. Shower/bathe self  03-Partial/moderate assistance  

F. Upper body dressing  03-Partial/moderate assistance  

G. Lower body dressing  03-Partial/moderate assistance  

H. Putting on/taking off footwear  03-Partial/moderate assistance  

- Mobility

A. Roll left and right  03-Partial/moderate assistance  

B. Sit to lying  03-Partial/moderate assistance  

C. Lying to sitting on side of bed  03-Partial/moderate assistance  

D. Sit to stand  03-Partial/moderate assistance  

E. Chair/bed-to-chair transfer  03-Partial/moderate assistance  

F. Toilet transfer  03-Partial/moderate assistance  

G. Car transfer  03-Partial/moderate assistance  

I. Walk 10 feet  03-Partial/moderate assistance  

J. Walk 50 feet with two turns  88-Not attempted due to medical condition or safety concerns  

K. Walk 150 feet  88-Not attempted due to medical condition or safety concerns  

L. Walking 10 feet on uneven surfaces  88-Not attempted due to medical condition or safety concerns  


M. 1 step (curb)  88-Not attempted due to medical condition or safety concerns  

N. 4 steps  88-Not attempted due to medical condition or safety concerns  

O. 12 steps  88-Not attempted due to medical condition or safety concerns  

P. Picking up object  88-Not attempted due to medical condition or safety concerns  

R. Wheel 50 feet with two turns  88-Not attempted due to medical condition or safety concerns  

S. Wheel 150 feet  88-Not attempted due to medical condition or safety concerns  

- Bladder and Bowel

Bladder continence  0-Always continent  

Bowel continence  0-Always continent  

- Endurance

Fair

- Balance

Fair

- Safety Awareness

Fair



[facility] DEFICITS:



Endurance, Balance, Mobility, Safety Awareness, and Self-Care



SIGNATURE PANEL:



Electronically signed by Dr. Edgar Dick M.D. on 08/31/2020 at 18:04 (CDT)

## 2020-09-01 RX ADMIN — MAGNESIUM OXIDE TAB 400 MG (241.3 MG ELEMENTAL MG) SCH MG: 400 (241.3 MG) TAB at 20:08

## 2020-09-01 RX ADMIN — ACETAMINOPHEN PRN MG: 500 TABLET, FILM COATED ORAL at 06:55

## 2020-09-01 RX ADMIN — APIXABAN SCH MG: 2.5 TABLET, FILM COATED ORAL at 08:57

## 2020-09-01 RX ADMIN — EZETIMIBE SCH MG: 10 TABLET ORAL at 08:57

## 2020-09-01 RX ADMIN — Medication SCH MG: at 08:55

## 2020-09-01 RX ADMIN — LEVOTHYROXINE SODIUM SCH MG: 75 TABLET ORAL at 06:56

## 2020-09-01 RX ADMIN — Medication SCH PATCH: at 08:55

## 2020-09-01 RX ADMIN — Medication SCH MG: at 20:08

## 2020-09-01 RX ADMIN — LOSARTAN POTASSIUM AND HYDROCHLOROTHIAZIDE SCH: 50; 12.5 TABLET, FILM COATED ORAL at 08:00

## 2020-09-01 RX ADMIN — PANTOPRAZOLE SODIUM SCH MG: 40 TABLET, DELAYED RELEASE ORAL at 08:56

## 2020-09-01 RX ADMIN — Medication SCH MG: at 20:09

## 2020-09-01 RX ADMIN — MONTELUKAST SODIUM SCH MG: 10 TABLET, FILM COATED ORAL at 08:57

## 2020-09-01 RX ADMIN — TIZANIDINE SCH MG: 4 TABLET ORAL at 08:56

## 2020-09-01 RX ADMIN — TRAMADOL HYDROCHLORIDE PRN MG: 50 TABLET, COATED ORAL at 13:30

## 2020-09-01 RX ADMIN — SENNOSIDES AND DOCUSATE SODIUM PRN TAB: 8.6; 5 TABLET ORAL at 20:08

## 2020-09-01 RX ADMIN — PREGABALIN SCH MG: 50 CAPSULE ORAL at 20:08

## 2020-09-01 RX ADMIN — PREGABALIN SCH MG: 50 CAPSULE ORAL at 08:56

## 2020-09-01 RX ADMIN — TIZANIDINE SCH MG: 4 TABLET ORAL at 20:08

## 2020-09-01 RX ADMIN — APIXABAN SCH MG: 2.5 TABLET, FILM COATED ORAL at 20:08

## 2020-09-01 NOTE — R.PN
PROGRESS NOTES



ENCOUNTER DATE AND TIME:



09/01/2020 17:45 (CDT)



NAME



HENRIETTA MATIAS



YOB: 1932



DATE OF ADMISSION:



08/24/2020 16:07 (CDT)



OSTEOPEROSISCHIEF COMPLAINT:



Bilateral knee pain and weakness, osteoperosis



SUBJECTIVE:



Pt denied any depression.

Pt denied any Shortness of Breath.

Patient states that pain is under control.

WBC 6.3, Hgb 11.9, prealbumin 17.1, Ca 8.4, UA esterase trace, bacteria 20-50, COVID-19 negative.

ADLs done with supervision to independence. Ambulated 2000' with good tolerance and independence usin
g a rolling walker. Up and down 15 steps with independence.



VITAL SIGNS



Temperature: 97.4 F

SBP/DBP: 127/57

Pulse: 67

Resp: 16



MEDICATION ALLERGIES:



No Known Drug Allergies (NKDA)



ENVIRONMENTAL ALLERGIES:



- Substance Allergies

None Known

- Other Allergies

None Known



NURSING:



- Shower

allowing shower



ACTIVITIES



OOB only with supervision



THERAPIES:



- Dietary and Nutrition

Adequate Nutrition. Nutritional Education. Nutritional Supplements. 



PHYSICAL EXAM



- Gen

Oriented to: person, time, and place

- Vital Signs

Vital signs stable, afebrile

- Skin

No skin breakdown.



Normacephalic

- Eyes

No abnormalities

- Neck

No abnormalities

- CVS

RRR

- Chest

No abnormalities

- Resp

Clear to auscultation

- Abd

Soft

- GI

Non distended



Deferred

- 

No abnormalities

- Ext

No significant edema

- MSK

4/5 weakness in both lower extremities.

- Neuro

No focal deficits

- Psych

No abnormalities



ASSESSMENT:



Currently, she has deficits of Self-Care, Locomotion, Safety Awareness, Balance, Transfers Control, S
ocial Cognition, and Sphincter Control.On 08/24/2020 she was admitted to [facility] with
 diagnosis OSTEOPEROSIS.Her impairment category is Debility 16 -  Debility (16).Pre-morbidly, Pt. was
 independent/mod-I in Safety Awareness, Social Cognition, Transfers Control, Sphincter Control, and E
ndurance; and she had good Communication and Self-Care.Pt. is now referred to [facility] for acute in-patient rehabilitation in order to maximize patient's functional independence
 in activities of daily living, strength, ROM, and mobility.Pt. is a 89 yo Right-handed female of [employer] race.- Rehab Goal

Patient has realistic goal of being discharged at assistance level 6-Rafaela to reside at Home with  Pt 
self.

MDM/PLAN:



- Physical Therapy

 Achieving independence - to improve, our physical therapists will perform initial evaluation of pt's
 status upon admission and devise an individualized program for Community Reintegration Activities

 Edema - to improve, our physical therapists will perform initial evaluation of pt's status upon admi
ssion and devise an individualized program for Elevation Training, and Lymphedema Therapy

 Gait dysfunction - to improve, our physical therapists will perform initial evaluation of pt's statu
s upon admission and devise an individualized program for Gait Training, and Wheel Chair mobility

 Inability to transfer - to improve, our physical therapists will perform initial evaluation of pt's 
status upon admission and devise an individualized program for Bed mobility

 Need for home safety evaluation - to improve, our physical therapists will perform initial evaluatio
n of pt's status upon admission and devise an individualized program for Home Evaluation

 Need in caregiver upon discharge - to improve, our physical therapists will perform initial evaluati
on of pt's status upon admission and devise an individualized program for Caregiver Training

 New precaution - to improve, our physical therapists will perform initial evaluation of pt's status 
upon admission and devise an individualized program for Patient precaution education

 Poor balance - to improve, our physical therapists will perform initial evaluation of pt's status up
on admission and devise an individualized program for Balance Training

 Weakness - to improve, our physical therapists will perform initial evaluation of pt's status upon a
dmission and devise an individualized program for Aquatic Therapy, Neuromuscular Reeducation, and Str
engthening

- Occupational Therapy

 ADL deficits - to improve, our occupation therapists will perform initial evaluation of pt's status 
upon admission and devise an individualized program for Bathing, Bed mobility, Community Reintegratio
n, Cooking, Dressing, Eating, Fine Motor Skills, Grooming, Homemaking, Kitchen Mobility, Laundry, Pat
ient Education, Safety Awareness, Splinting - Positioning, Transfers(Toilet, Tub, Shower), and Wheel 
Chair Management

 Cognitive deficits - to improve, our occupation therapists will perform initial evaluation of pt's s
tatus upon admission and devise an individualized program for Cognition - orientation

 Need for care giver - to improve, our occupation therapists will perform initial evaluation of pt's 
status upon admission and devise an individualized program for Caregiver Training

 Weakness - to improve, our occupation therapists will perform initial evaluation of pt's status upon
 admission and devise an individualized program for Aquatic Therapy, Balance, Endurance, UE ROM, and 
UE strengthening

- Other

 See attached MAR (Medication Administration Record)

- Diet Type

Continue Regular

- Diet - Liquid Texture

Continue Regular

- Tube Feed

Continue N/A

- Diet - Solid Texture

Continue Regular

- Shower

 allowing shower



FUNCTIONAL STATUS: UPDATED AT WEEKLY TEAM CONFERENCE



- Bladder

Same accident frequency: 7-Ind - No accidents in the past 7 days

- Bowel

Same accident frequency: 7-Ind - No accidents in the past 7 days

- Walking

Same score based on distance walked: 0(N/A)

Same score based on distance walked: 1(<=50ft)

- Wheelchair

Same score based on distance traveled: 0(N/A)



FUNCTIONAL STATUS:



- Self-Care

A. Eating    Ind   

B. Grooming    Rafaela   

C. Bathing    maxA   

D. Dressing - Upper     Luis   

E. Dressing - Lower     maxA   

F. Toileting    Luis   

- Sphincter Control

G. Bladder control     Rafaela   

H. Bowel control     Rafaela   

- Transfers Control

I. Bed/Chair/Wheelchair     maxA   

J. Toilet    maxA   

K. Tub/Shower    maxA   

- Locomotion

L. Walk/Wheelchair (B)     modA   

M. Stairs    ADNO   

- Communication

N. Comprehension (B)     Rafaela   

O. Expression (B)     Rafaela   

- Social Cognition

P. Social Interaction    Rafaela   

Q. Problem Solving    sup   

R. Memory    sup   

- Endurance

Fair

- Balance

Fair

- Safety Awareness

Fair



QI SCORES:



- Self-Care

A. Eating  06-Independent  

B. Oral hygiene  04-Supervision or touching assistance  

C. Toileting hygiene  04-Supervision or touching assistance  

E. Shower/bathe self  03-Partial/moderate assistance  

F. Upper body dressing  03-Partial/moderate assistance  

G. Lower body dressing  03-Partial/moderate assistance  

H. Putting on/taking off footwear  03-Partial/moderate assistance  

- Mobility

M. 1 step (curb)  88-Not attempted due to medical condition or safety concerns  

N. 4 steps  88-Not attempted due to medical condition or safety concerns  

O. 12 steps  88-Not attempted due to medical condition or safety concerns  

P. Picking up object  88-Not attempted due to medical condition or safety concerns  

R. Wheel 50 feet with two turns  88-Not attempted due to medical condition or safety concerns  

A. Roll left and right  03-Partial/moderate assistance  

B. Sit to lying  03-Partial/moderate assistance  

C. Lying to sitting on side of bed  03-Partial/moderate assistance  

D. Sit to stand  03-Partial/moderate assistance  

E. Chair/bed-to-chair transfer  03-Partial/moderate assistance  

F. Toilet transfer  03-Partial/moderate assistance  

G. Car transfer  03-Partial/moderate assistance  

I. Walk 10 feet  03-Partial/moderate assistance  

J. Walk 50 feet with two turns  88-Not attempted due to medical condition or safety concerns  

K. Walk 150 feet  88-Not attempted due to medical condition or safety concerns  

L. Walking 10 feet on uneven surfaces  88-Not attempted due to medical condition or safety concerns  


S. Wheel 150 feet  88-Not attempted due to medical condition or safety concerns  

- Bladder and Bowel

Bladder continence  0-Always continent  

Bowel continence  0-Always continent  

- Endurance

Fair

- Balance

Fair

- Safety Awareness

Fair



[facility] DEFICITS:



Endurance, Balance, Mobility, Safety Awareness, and Self-Care



SIGNATURE PANEL:



Electronically signed by Dr. Edgar Dick M.D. on 09/01/2020 at 17:51 (CDT)

## 2020-09-01 NOTE — P.PN
Subjective


Date of Service: 09/01/20


Chief Complaint: BACK PAIN





SHE IS  GETTING READY  FOR PT.





SHE IS STABLE . HAS NO PAIN. 


THINKING ABOUT HOME HEALTH. 





SHE IS IN SHOWER TODAY.


TALKED TO THERAPIST


SHE IS WALKING BETTER BUT STILL NOT STABLE AND TENDS TO HURRY.





MS. MATIAS HAS BACK PAIN R SIDE LOWER BACK. 


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   WE NEED TO FU ON THIS AGAIN AS SHE FELL

RECENTLY. 





SHE IS SOMEWHAT BETTER. 


SHE HAS NO CHEST PAIN.





STABLE. NO CHANGES. 





Physical Examination





- Vital Signs


Temperature: 97.4 F


Blood Pressure: 127/57


Pulse: 67


Respirations: 18


Pulse Ox (%): 94





- Studies


Medications List Reviewed: Yes





Assessment And Plan





- Current Problems (Diagnosis)


(1) General weakness


Current Visit: Yes   Status: Chronic   


Plan: 


WORSE LATELY. 


CONT PT. 


DAILY VISIT. 


DIETARY CONSULT. 








(2) History of hip fracture


Current Visit: Yes   Status: Acute   


Plan: 


CONT OT AND PT





WANTS TO GO HOME SOON.








(3) Back pain


Current Visit: Yes   Status: Chronic   


Plan: 


ACUTE ON CHRONIC. 


MRI LS AGAIN .


SHE IS ON LYRICA AND REFUSES PAIN MEDS. 


SHE ALSO HAS LIDOCAIN PATCH. 


ADD ZANAFLEX.


Qualifiers: 


   Back pain location: low back pain   Chronicity: chronic   Back pain 

laterality: right   Sciatica presence: with sciatica   Sciatica laterality: 

sciatica of right side   Qualified Code(s): M54.41 - Lumbago with sciatica, 

right side; G89.29 - Other chronic pain

## 2020-09-02 RX ADMIN — MAGNESIUM OXIDE TAB 400 MG (241.3 MG ELEMENTAL MG) SCH MG: 400 (241.3 MG) TAB at 20:00

## 2020-09-02 RX ADMIN — APIXABAN SCH MG: 2.5 TABLET, FILM COATED ORAL at 08:13

## 2020-09-02 RX ADMIN — PREGABALIN SCH MG: 50 CAPSULE ORAL at 08:13

## 2020-09-02 RX ADMIN — TRAMADOL HYDROCHLORIDE PRN MG: 50 TABLET, COATED ORAL at 19:56

## 2020-09-02 RX ADMIN — TRAMADOL HYDROCHLORIDE PRN MG: 50 TABLET, COATED ORAL at 08:12

## 2020-09-02 RX ADMIN — Medication SCH PATCH: at 09:56

## 2020-09-02 RX ADMIN — LOSARTAN POTASSIUM AND HYDROCHLOROTHIAZIDE SCH TAB: 50; 12.5 TABLET, FILM COATED ORAL at 08:12

## 2020-09-02 RX ADMIN — MONTELUKAST SODIUM SCH MG: 10 TABLET, FILM COATED ORAL at 08:13

## 2020-09-02 RX ADMIN — LEVOTHYROXINE SODIUM SCH MG: 75 TABLET ORAL at 06:23

## 2020-09-02 RX ADMIN — APIXABAN SCH MG: 2.5 TABLET, FILM COATED ORAL at 19:55

## 2020-09-02 RX ADMIN — Medication SCH MG: at 19:55

## 2020-09-02 RX ADMIN — Medication SCH MG: at 19:59

## 2020-09-02 RX ADMIN — PREGABALIN SCH MG: 50 CAPSULE ORAL at 19:56

## 2020-09-02 RX ADMIN — TIZANIDINE SCH MG: 4 TABLET ORAL at 08:13

## 2020-09-02 RX ADMIN — SENNOSIDES AND DOCUSATE SODIUM PRN TAB: 8.6; 5 TABLET ORAL at 19:55

## 2020-09-02 RX ADMIN — PANTOPRAZOLE SODIUM SCH MG: 40 TABLET, DELAYED RELEASE ORAL at 07:20

## 2020-09-02 RX ADMIN — TIZANIDINE SCH MG: 4 TABLET ORAL at 19:55

## 2020-09-02 RX ADMIN — ACETAMINOPHEN PRN MG: 500 TABLET, FILM COATED ORAL at 12:40

## 2020-09-02 RX ADMIN — EZETIMIBE SCH MG: 10 TABLET ORAL at 08:13

## 2020-09-02 RX ADMIN — Medication SCH MG: at 08:13

## 2020-09-02 NOTE — R.PN
PROGRESS NOTES



ENCOUNTER DATE AND TIME:



09/02/2020 18:13 (CDT)



NAME



HENRIETTA MATIAS



YOB: 1932



DATE OF ADMISSION:



08/24/2020 16:07 (CDT)



OSTEOPEROSISCHIEF COMPLAINT:



Bilateral knee pain and weakness, osteoperosis



SUBJECTIVE:



Pt denied any depression.

Pt denied any Shortness of Breath.

Patient states that pain is under control.

WBC 6.3, Hgb 11.9, prealbumin 17.1, Ca 8.4, UA esterase trace, bacteria 20-50, COVID-19 negative.

ADLs done with modified independence to independence. Ambulated 2000' with good tolerance and indepen
dence using a rolling walker. Up and down 15 steps with independence.



VITAL SIGNS



Temperature: 98.5 F

SBP/DBP: 139/57

Pulse: 71

Resp: 16



MEDICATION ALLERGIES:



No Known Drug Allergies (NKDA)



ENVIRONMENTAL ALLERGIES:



- Substance Allergies

None Known

- Other Allergies

None Known



NURSING:



- Shower

allowing shower



ACTIVITIES



OOB only with supervision



THERAPIES:



- Dietary and Nutrition

Adequate Nutrition. Nutritional Education. Nutritional Supplements. 



PHYSICAL EXAM



- Gen

Oriented to: person, time, and place

- Vital Signs

Vital signs stable, afebrile

- Skin

No skin breakdown.



Normacephalic

- Eyes

No abnormalities

- Neck

No abnormalities

- CVS

RRR

- Chest

No abnormalities

- Resp

Clear to auscultation

- Abd

Soft

- GI

Non distended



Deferred

- 

No abnormalities

- Ext

No significant edema

- MSK

4/5 weakness in both lower extremities.

- Neuro

No focal deficits

- Psych

No abnormalities



ASSESSMENT:



Currently, she has deficits of Self-Care, Locomotion, Safety Awareness, Balance, Transfers Control, S
ocial Cognition, and Sphincter Control.On 08/24/2020 she was admitted to [facility] ([facility] with
 diagnosis OSTEOPEROSIS.Her impairment category is Debility 16 -  Debility (16).Pt. is now referred t
[facility] for acute in-patient rehabilitation in order to maximize patient'
s functional independence in activities of daily living, strength, ROM, and mobility.Pre-morbidly, Pt
. was independent/mod-I in Safety Awareness, Social Cognition, Transfers Control, Sphincter Control, 
and Endurance; and she had good Communication and Self-Care.Pt. is a 87 yo Right-handed female of [employer] race.- Rehab Goal

Patient has realistic goal of being discharged at assistance level 6-Rafaela to reside at Home with  Pt 
self.

MDM/PLAN:



- Physical Therapy

 Achieving independence - to improve, our physical therapists will perform initial evaluation of pt's
 status upon admission and devise an individualized program for Community Reintegration Activities

 Edema - to improve, our physical therapists will perform initial evaluation of pt's status upon admi
ssion and devise an individualized program for Elevation Training, and Lymphedema Therapy

 Gait dysfunction - to improve, our physical therapists will perform initial evaluation of pt's statu
s upon admission and devise an individualized program for Gait Training, and Wheel Chair mobility

 Inability to transfer - to improve, our physical therapists will perform initial evaluation of pt's 
status upon admission and devise an individualized program for Bed mobility

 Need for home safety evaluation - to improve, our physical therapists will perform initial evaluatio
n of pt's status upon admission and devise an individualized program for Home Evaluation

 Need in caregiver upon discharge - to improve, our physical therapists will perform initial evaluati
on of pt's status upon admission and devise an individualized program for Caregiver Training

 New precaution - to improve, our physical therapists will perform initial evaluation of pt's status 
upon admission and devise an individualized program for Patient precaution education

 Poor balance - to improve, our physical therapists will perform initial evaluation of pt's status up
on admission and devise an individualized program for Balance Training

 Weakness - to improve, our physical therapists will perform initial evaluation of pt's status upon a
dmission and devise an individualized program for Aquatic Therapy, Neuromuscular Reeducation, and Str
engthening

- Occupational Therapy

 ADL deficits - to improve, our occupation therapists will perform initial evaluation of pt's status 
upon admission and devise an individualized program for Bathing, Bed mobility, Community Reintegratio
n, Cooking, Dressing, Eating, Fine Motor Skills, Grooming, Homemaking, Kitchen Mobility, Laundry, Pat
ient Education, Safety Awareness, Splinting - Positioning, Transfers(Toilet, Tub, Shower), and Wheel 
Chair Management

 Cognitive deficits - to improve, our occupation therapists will perform initial evaluation of pt's s
tatus upon admission and devise an individualized program for Cognition - orientation

 Need for care giver - to improve, our occupation therapists will perform initial evaluation of pt's 
status upon admission and devise an individualized program for Caregiver Training

 Weakness - to improve, our occupation therapists will perform initial evaluation of pt's status upon
 admission and devise an individualized program for Aquatic Therapy, Balance, Endurance, UE ROM, and 
UE strengthening

- Other

 See attached MAR (Medication Administration Record)

- Diet Type

Continue Regular

- Diet - Liquid Texture

Continue Regular

- Tube Feed

Continue N/A

- Diet - Solid Texture

Continue Regular

- Shower

 allowing shower



FUNCTIONAL STATUS: UPDATED AT WEEKLY TEAM CONFERENCE



- Bladder

Same accident frequency: 7-Ind - No accidents in the past 7 days

- Bowel

Same accident frequency: 7-Ind - No accidents in the past 7 days

- Walking

Same score based on distance walked: 0(N/A)

Same score based on distance walked: 1(<=50ft)

- Wheelchair

Same score based on distance traveled: 0(N/A)



FUNCTIONAL STATUS:



- Self-Care

A. Eating    Ind   

B. Grooming    Rafaela   

C. Bathing    maxA   

D. Dressing - Upper     Luis   

E. Dressing - Lower     maxA   

F. Toileting    Luis   

- Sphincter Control

G. Bladder control     Rafaela   

H. Bowel control     Rafaela   

- Transfers Control

I. Bed/Chair/Wheelchair     maxA   

J. Toilet    maxA   

K. Tub/Shower    maxA   

- Locomotion

L. Walk/Wheelchair (B)     modA   

M. Stairs    ADNO   

- Communication

N. Comprehension (B)     Rafaela   

O. Expression (B)     Rafaela   

- Social Cognition

P. Social Interaction    Rafaela   

Q. Problem Solving    sup   

R. Memory    sup   

- Endurance

Fair

- Balance

Fair

- Safety Awareness

Fair



QI SCORES:



- Self-Care

A. Eating  06-Independent  

B. Oral hygiene  04-Supervision or touching assistance  

C. Toileting hygiene  04-Supervision or touching assistance  

E. Shower/bathe self  03-Partial/moderate assistance  

F. Upper body dressing  03-Partial/moderate assistance  

G. Lower body dressing  03-Partial/moderate assistance  

H. Putting on/taking off footwear  03-Partial/moderate assistance  

- Mobility

M. 1 step (curb)  88-Not attempted due to medical condition or safety concerns  

N. 4 steps  88-Not attempted due to medical condition or safety concerns  

O. 12 steps  88-Not attempted due to medical condition or safety concerns  

P. Picking up object  88-Not attempted due to medical condition or safety concerns  

R. Wheel 50 feet with two turns  88-Not attempted due to medical condition or safety concerns  

A. Roll left and right  03-Partial/moderate assistance  

B. Sit to lying  03-Partial/moderate assistance  

C. Lying to sitting on side of bed  03-Partial/moderate assistance  

D. Sit to stand  03-Partial/moderate assistance  

E. Chair/bed-to-chair transfer  03-Partial/moderate assistance  

F. Toilet transfer  03-Partial/moderate assistance  

G. Car transfer  03-Partial/moderate assistance  

I. Walk 10 feet  03-Partial/moderate assistance  

J. Walk 50 feet with two turns  88-Not attempted due to medical condition or safety concerns  

K. Walk 150 feet  88-Not attempted due to medical condition or safety concerns  

L. Walking 10 feet on uneven surfaces  88-Not attempted due to medical condition or safety concerns  


S. Wheel 150 feet  88-Not attempted due to medical condition or safety concerns  

- Bladder and Bowel

Bladder continence  0-Always continent  

Bowel continence  0-Always continent  

- Endurance

Fair

- Balance

Fair

- Safety Awareness

Fair



CURRENT [facility]. DEFICITS:



Endurance, Balance, Mobility, Safety Awareness, and Self-Care



SIGNATURE PANEL:



Electronically signed by Dr. Edgar Dick M.D. on 09/02/2020 at 18:16 (CDT)

## 2020-09-02 NOTE — P.PN
Subjective


Date of Service: 09/02/20


Chief Complaint: BACK PAIN





SHE IS  GETTING READY  FOR PT.





SHE IS STABLE . HAS NO PAIN. 


THINKING ABOUT HOME HEALTH. 





SHE IS IN SHOWER TODAY.


TALKED TO THERAPIST


SHE IS WALKING BETTER BUT STILL NOT STABLE AND TENDS TO HURRY.





MS. MATIAS HAS BACK PAIN R SIDE LOWER BACK. 


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   WE NEED TO FU ON THIS AGAIN AS SHE FELL

RECENTLY. 





SHE IS SOMEWHAT BETTER. 


SHE HAS NO CHEST PAIN.





STABLE. NO CHANGES. 





Physical Examination





- Vital Signs


Temperature: 98.3 F


Blood Pressure: 125/61


Pulse: 81


Respirations: 18


Pulse Ox (%): 95





- Studies


Medications List Reviewed: Yes





Assessment And Plan





- Current Problems (Diagnosis)


(1) General weakness


Current Visit: Yes   Status: Chronic   


Plan: 


WORSE LATELY. 


CONT PT. 


DAILY VISIT. 


DIETARY CONSULT. 





NO CHANGES , DOES PT. 


DC ON FRIDAY.








(2) History of hip fracture


Current Visit: Yes   Status: Acute   


Plan: 


CONT OT AND PT





WANTS TO GO HOME SOON.








(3) Back pain


Current Visit: Yes   Status: Chronic   


Plan: 


ACUTE ON CHRONIC. 


MRI LS AGAIN .


SHE IS ON LYRICA AND REFUSES PAIN MEDS. 


SHE ALSO HAS LIDOCAIN PATCH. 


ADD ZANAFLEX.


Qualifiers: 


   Back pain location: low back pain   Chronicity: chronic   Back pain 

laterality: right   Sciatica presence: with sciatica   Sciatica laterality: 

sciatica of right side   Qualified Code(s): M54.41 - Lumbago with sciatica, 

right side; G89.29 - Other chronic pain

## 2020-09-03 VITALS — TEMPERATURE: 98.2 F

## 2020-09-03 LAB
ALBUMIN SERPL BCP-MCNC: 2.9 G/DL (ref 3.4–5)
BUN BLD-MCNC: 17 MG/DL (ref 7–18)
GLUCOSE SERPLBLD-MCNC: 81 MG/DL (ref 74–106)
HCT VFR BLD CALC: 31.5 % (ref 36–45)
LYMPHOCYTES # SPEC AUTO: 2.6 K/UL (ref 0.7–4.9)
PMV BLD: 7.3 FL (ref 7.6–11.3)
POTASSIUM SERPL-SCNC: 4 MMOL/L (ref 3.5–5.1)
PREALB SERPL-MCNC: 16 MG/DL (ref 20–40)
RBC # BLD: 3.49 M/UL (ref 3.86–4.86)

## 2020-09-03 RX ADMIN — Medication SCH PATCH: at 08:39

## 2020-09-03 RX ADMIN — TIZANIDINE SCH MG: 4 TABLET ORAL at 08:41

## 2020-09-03 RX ADMIN — TIZANIDINE SCH MG: 4 TABLET ORAL at 19:33

## 2020-09-03 RX ADMIN — APIXABAN SCH MG: 2.5 TABLET, FILM COATED ORAL at 08:40

## 2020-09-03 RX ADMIN — Medication SCH MG: at 19:46

## 2020-09-03 RX ADMIN — PANTOPRAZOLE SODIUM SCH MG: 40 TABLET, DELAYED RELEASE ORAL at 08:40

## 2020-09-03 RX ADMIN — PREGABALIN SCH MG: 50 CAPSULE ORAL at 08:40

## 2020-09-03 RX ADMIN — MELATONIN PRN MG: 3 TAB ORAL at 19:47

## 2020-09-03 RX ADMIN — LEVOTHYROXINE SODIUM SCH MG: 75 TABLET ORAL at 07:04

## 2020-09-03 RX ADMIN — Medication SCH MG: at 19:32

## 2020-09-03 RX ADMIN — PREGABALIN SCH MG: 50 CAPSULE ORAL at 19:33

## 2020-09-03 RX ADMIN — EZETIMIBE SCH MG: 10 TABLET ORAL at 08:40

## 2020-09-03 RX ADMIN — MAGNESIUM OXIDE TAB 400 MG (241.3 MG ELEMENTAL MG) SCH MG: 400 (241.3 MG) TAB at 19:47

## 2020-09-03 RX ADMIN — LOSARTAN POTASSIUM AND HYDROCHLOROTHIAZIDE SCH: 50; 12.5 TABLET, FILM COATED ORAL at 08:00

## 2020-09-03 RX ADMIN — SENNOSIDES AND DOCUSATE SODIUM PRN TAB: 8.6; 5 TABLET ORAL at 19:32

## 2020-09-03 RX ADMIN — MONTELUKAST SODIUM SCH MG: 10 TABLET, FILM COATED ORAL at 08:41

## 2020-09-03 RX ADMIN — Medication SCH MG: at 08:39

## 2020-09-03 RX ADMIN — ACETAMINOPHEN PRN MG: 500 TABLET, FILM COATED ORAL at 08:39

## 2020-09-03 RX ADMIN — APIXABAN SCH MG: 2.5 TABLET, FILM COATED ORAL at 19:33

## 2020-09-03 NOTE — R.PN
PROGRESS NOTES



ENCOUNTER DATE AND TIME:



09/03/2020 17:50 (CDT)



NAME



HENRIETTA MATIAS



YOB: 1932



DATE OF ADMISSION:



08/24/2020 16:07 (CDT)



OSTEOPEROSISCHIEF COMPLAINT:



Bilateral knee pain and weakness, osteoperosis



SUBJECTIVE:



Pt denied any depression.

Pt denied any Shortness of Breath.

Patient states that pain is under control.

WBC 5.0, Hgb 10.9, prealbumin 16.0, Ca 8.3, UA esterase trace, bacteria 20-50, COVID-19 negative.

ADLs done with modified independence to independence. Ambulated 500' with good tolerance and standby 
assistance using a rolling walker. Up and down 15 steps with independence.



VITAL SIGNS



Temperature: 98.0 F

SBP/DBP: 107/50

Pulse: 65

Resp: 16



MEDICATION ALLERGIES:



No Known Drug Allergies (NKDA)



ENVIRONMENTAL ALLERGIES:



- Substance Allergies

None Known

- Other Allergies

None Known



NURSING:



- Shower

allowing shower



ACTIVITIES



OOB only with supervision



THERAPIES:



- Dietary and Nutrition

Adequate Nutrition. Nutritional Education. Nutritional Supplements. 



PHYSICAL EXAM



- Gen

Oriented to: person, time, and place

- Vital Signs

Vital signs stable, afebrile

- Skin

No skin breakdown.



Normacephalic

- Eyes

No abnormalities

- Neck

No abnormalities

- CVS

RRR

- Chest

No abnormalities

- Resp

Clear to auscultation

- Abd

Soft

- GI

Non distended



Deferred

- 

No abnormalities

- Ext

No significant edema

- MSK

4/5 weakness in both lower extremities.

- Neuro

No focal deficits

- Psych

No abnormalities



ASSESSMENT:



Currently, she has deficits of Self-Care, Locomotion, Safety Awareness, Balance, Transfers Control, S
ocial Cognition, and Sphincter Control.On 08/24/2020 she was admitted to [facility] with
 diagnosis OSTEOPEROSIS.Her impairment category is Debility 16 -  Debility (16).Pt. is now referred t
[facility] for acute in-patient rehabilitation in order to maximize patient'
s functional independence in activities of daily living, strength, ROM, and mobility.Pre-morbidly, Pt
. was independent/mod-I in Safety Awareness, Social Cognition, Transfers Control, Sphincter Control, 
and Endurance; and she had good Communication and Self-Care.Pt. is a 89 yo Right-handed female of [place] race.- Rehab Goal

Patient has realistic goal of being discharged at assistance level 6-Rafaela to reside at Home with  Pt 
self.

MDM/PLAN:



- Physical Therapy

 Achieving independence - to improve, our physical therapists will perform initial evaluation of pt's
 status upon admission and devise an individualized program for Community Reintegration Activities

 Edema - to improve, our physical therapists will perform initial evaluation of pt's status upon admi
ssion and devise an individualized program for Elevation Training, and Lymphedema Therapy

 Gait dysfunction - to improve, our physical therapists will perform initial evaluation of pt's statu
s upon admission and devise an individualized program for Gait Training, and Wheel Chair mobility

 Inability to transfer - to improve, our physical therapists will perform initial evaluation of pt's 
status upon admission and devise an individualized program for Bed mobility

 Need for home safety evaluation - to improve, our physical therapists will perform initial evaluatio
n of pt's status upon admission and devise an individualized program for Home Evaluation

 Need in caregiver upon discharge - to improve, our physical therapists will perform initial evaluati
on of pt's status upon admission and devise an individualized program for Caregiver Training

 New precaution - to improve, our physical therapists will perform initial evaluation of pt's status 
upon admission and devise an individualized program for Patient precaution education

 Poor balance - to improve, our physical therapists will perform initial evaluation of pt's status up
on admission and devise an individualized program for Balance Training

 Weakness - to improve, our physical therapists will perform initial evaluation of pt's status upon a
dmission and devise an individualized program for Aquatic Therapy, Neuromuscular Reeducation, and Str
engthening

- Occupational Therapy

 ADL deficits - to improve, our occupation therapists will perform initial evaluation of pt's status 
upon admission and devise an individualized program for Bathing, Bed mobility, Community Reintegratio
n, Cooking, Dressing, Eating, Fine Motor Skills, Grooming, Homemaking, Kitchen Mobility, Laundry, Pat
ient Education, Safety Awareness, Splinting - Positioning, Transfers(Toilet, Tub, Shower), and Wheel 
Chair Management

 Cognitive deficits - to improve, our occupation therapists will perform initial evaluation of pt's s
tatus upon admission and devise an individualized program for Cognition - orientation

 Need for care giver - to improve, our occupation therapists will perform initial evaluation of pt's 
status upon admission and devise an individualized program for Caregiver Training

 Weakness - to improve, our occupation therapists will perform initial evaluation of pt's status upon
 admission and devise an individualized program for Aquatic Therapy, Balance, Endurance, UE ROM, and 
UE strengthening

- Other

 See attached MAR (Medication Administration Record)

- Diet Type

Continue Regular

- Diet - Liquid Texture

Continue Regular

- Tube Feed

Continue N/A

- Diet - Solid Texture

Continue Regular

- Shower

 allowing shower



FUNCTIONAL STATUS: UPDATED AT WEEKLY TEAM CONFERENCE



- Bladder

Same accident frequency: 7-Ind - No accidents in the past 7 days

- Bowel

Same accident frequency: 7-Ind - No accidents in the past 7 days

- Walking

Same score based on distance walked: 0(N/A)

Same score based on distance walked: 1(<=50ft)

- Wheelchair

Same score based on distance traveled: 0(N/A)



FUNCTIONAL STATUS:



- Self-Care

A. Eating    Ind   

B. Grooming    Rafaela   

C. Bathing    maxA   

D. Dressing - Upper     Luis   

E. Dressing - Lower     maxA   

F. Toileting    Luis   

- Sphincter Control

G. Bladder control     Rafaela   

H. Bowel control     Rafaela   

- Transfers Control

I. Bed/Chair/Wheelchair     maxA   

J. Toilet    maxA   

K. Tub/Shower    maxA   

- Locomotion

L. Walk/Wheelchair (B)     modA   

M. Stairs    ADNO   

- Communication

N. Comprehension (B)     Rafaela   

O. Expression (B)     Rafaela   

- Social Cognition

P. Social Interaction    Rafaela   

Q. Problem Solving    sup   

R. Memory    sup   

- Endurance

Fair

- Balance

Fair

- Safety Awareness

Fair



QI SCORES:



- Self-Care

A. Eating  06-Independent  

B. Oral hygiene  04-Supervision or touching assistance  

C. Toileting hygiene  04-Supervision or touching assistance  

E. Shower/bathe self  03-Partial/moderate assistance  

F. Upper body dressing  03-Partial/moderate assistance  

G. Lower body dressing  03-Partial/moderate assistance  

H. Putting on/taking off footwear  03-Partial/moderate assistance  

- Mobility

M. 1 step (curb)  88-Not attempted due to medical condition or safety concerns  

N. 4 steps  88-Not attempted due to medical condition or safety concerns  

O. 12 steps  88-Not attempted due to medical condition or safety concerns  

P. Picking up object  88-Not attempted due to medical condition or safety concerns  

R. Wheel 50 feet with two turns  88-Not attempted due to medical condition or safety concerns  

A. Roll left and right  03-Partial/moderate assistance  

B. Sit to lying  03-Partial/moderate assistance  

C. Lying to sitting on side of bed  03-Partial/moderate assistance  

D. Sit to stand  03-Partial/moderate assistance  

E. Chair/bed-to-chair transfer  03-Partial/moderate assistance  

F. Toilet transfer  03-Partial/moderate assistance  

G. Car transfer  03-Partial/moderate assistance  

I. Walk 10 feet  03-Partial/moderate assistance  

J. Walk 50 feet with two turns  88-Not attempted due to medical condition or safety concerns  

K. Walk 150 feet  88-Not attempted due to medical condition or safety concerns  

L. Walking 10 feet on uneven surfaces  88-Not attempted due to medical condition or safety concerns  


S. Wheel 150 feet  88-Not attempted due to medical condition or safety concerns  

- Bladder and Bowel

Bladder continence  0-Always continent  

Bowel continence  0-Always continent  

- Endurance

Fair

- Balance

Fair

- Safety Awareness

Fair



CURRENT FUNC. DEFICITS:



Endurance, Balance, Mobility, Safety Awareness, and Self-Care



SIGNATURE PANEL:



Electronically signed by Dr. Edgar Dick M.D. on 09/03/2020 at 17:55 (CDT)

## 2020-09-03 NOTE — P.PN
Subjective


Date of Service: 09/03/20


Chief Complaint: BACK PAIN


Subjective: Improving





SHE IS  GETTING READY  FOR PT.





SHE IS STABLE . HAS NO PAIN. 


THINKING ABOUT HOME HEALTH. 





SHE IS IN SHOWER TODAY.


TALKED TO THERAPIST


SHE IS WALKING BETTER BUT STILL NOT STABLE AND TENDS TO HURRY.





MS. MATIAS HAS BACK PAIN R SIDE LOWER BACK. 


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   WE NEED TO FU ON THIS AGAIN AS SHE FELL

RECENTLY. 





SHE IS SOMEWHAT BETTER. 


SHE HAS NO CHEST PAIN.





STABLE. NO CHANGES. 





SHE IS HAVING NO NEW ISUES. 


BACK PAIN IS CHRONIC.





Physical Examination





- Vital Signs


Temperature: 98.2 F


Blood Pressure: 133/58


Pulse: 80


Respirations: 16


Pulse Ox (%): 98





- Physical Exam


General: Oriented x3, Mild distress


HEENT: Atraumatic, PERRLA, EOMI


Neck: Supple, JVD not distended


Respiratory: Clear to auscultation bilaterally, Normal air movement


Cardiovascular: Regular rate/rhythm, Normal S1 S2


Gastrointestinal: Normal bowel sounds, No tenderness


Musculoskeletal: No tenderness


Integumentary: No rashes


Neurological: Normal speech, Normal tone, Normal affect


Lymphatics: No axilla or inguinal lymphadenopathy





- Studies


Laboratory Data (last 24 hrs)





09/03/20 06:01: Sodium 134 L, Potassium 4.0, BUN 17, Creatinine 0.59, Glucose 81


09/03/20 06:01: WBC 5.0  D, Hgb 10.9 L, Hct 31.5 L, Plt Count 309





Medications List Reviewed: Yes





Assessment And Plan





- Current Problems (Diagnosis)


(1) General weakness


Current Visit: Yes   Status: Chronic   


Plan: 


WORSE LATELY. 


CONT PT. 


DAILY VISIT. 


DIETARY CONSULT. 





NO CHANGES , DOES PT. 


DC ON FRIDAY.








(2) History of hip fracture


Current Visit: Yes   Status: Acute   


Plan: 


CONT OT AND PT





WANTS TO GO HOME SOON.








(3) Back pain


Current Visit: Yes   Status: Chronic   


Plan: 


ACUTE ON CHRONIC. 


MRI LS AGAIN .


SHE IS ON LYRICA AND REFUSES PAIN MEDS. 


SHE ALSO HAS LIDOCAIN PATCH. 


ADD ZANAFLEX.





CONT PT


SHE HAS HOME BALANCE EXERCISES


Qualifiers: 


   Back pain location: low back pain   Chronicity: chronic   Back pain 

laterality: right   Sciatica presence: with sciatica   Sciatica laterality: 

sciatica of right side   Qualified Code(s): M54.41 - Lumbago with sciatica, 

right side; G89.29 - Other chronic pain

## 2020-09-04 VITALS — SYSTOLIC BLOOD PRESSURE: 136 MMHG | DIASTOLIC BLOOD PRESSURE: 62 MMHG

## 2020-09-04 RX ADMIN — PANTOPRAZOLE SODIUM SCH MG: 40 TABLET, DELAYED RELEASE ORAL at 08:20

## 2020-09-04 RX ADMIN — LOSARTAN POTASSIUM AND HYDROCHLOROTHIAZIDE SCH TAB: 50; 12.5 TABLET, FILM COATED ORAL at 08:23

## 2020-09-04 RX ADMIN — APIXABAN SCH MG: 2.5 TABLET, FILM COATED ORAL at 08:21

## 2020-09-04 RX ADMIN — EZETIMIBE SCH MG: 10 TABLET ORAL at 08:21

## 2020-09-04 RX ADMIN — Medication SCH PATCH: at 08:21

## 2020-09-04 RX ADMIN — Medication SCH MG: at 08:20

## 2020-09-04 RX ADMIN — MONTELUKAST SODIUM SCH MG: 10 TABLET, FILM COATED ORAL at 08:20

## 2020-09-04 RX ADMIN — LEVOTHYROXINE SODIUM SCH MG: 75 TABLET ORAL at 06:22

## 2020-09-04 RX ADMIN — TIZANIDINE SCH MG: 4 TABLET ORAL at 08:20

## 2020-09-04 RX ADMIN — PREGABALIN SCH MG: 50 CAPSULE ORAL at 08:21

## 2020-09-04 NOTE — P.RH.PN
Estimated Length of Stay: 12


Expected Discharge Date: 09/04/20


Discharge Disposition Plan: Home


Family Support: Yes


Long Term Goal: Mobility, Transfers, Self Care


Vital Signs: 


                                Last Vital Signs











Temp  98.2 F   09/04/20 08:26


 


Pulse  74   09/04/20 08:26


 


Resp  14   09/04/20 08:26


 


BP  136/62   09/04/20 08:26


 


Pulse Ox  96   09/04/20 08:26











Laboratory: 


                             Laboratory Last Values











WBC  5.0 K/uL (4.3-10.9)  D 09/03/20  06:01    


 


RBC  3.49 M/uL (3.86-4.86)  L  09/03/20  06:01    


 


Hgb  10.9 g/dL (12.0-15.0)  L  09/03/20  06:01    


 


Hct  31.5 % (36.0-45.0)  L  09/03/20  06:01    


 


MCV  90.2 fL ()   09/03/20  06:01    


 


MCH  31.2 pg (27.0-35.0)   09/03/20  06:01    


 


MCHC  34.6 g/dL (32.0-36.0)   09/03/20  06:01    


 


RDW  13.0 % (12.1-15.2)   09/03/20  06:01    


 


Plt Count  309 K/uL (152-406)   09/03/20  06:01    


 


MPV  7.3 fL (7.6-11.3)  L  09/03/20  06:01    


 


Neutrophils %  30.6 % (41.7-73.7)  L  09/03/20  06:01    


 


Lymphocytes %  52.3 % (15.3-44.8)  H  09/03/20  06:01    


 


Monocytes %  12.6 % (3.3-12.3)  H  09/03/20  06:01    


 


Eosinophils %  2.5 % (0-4.4)   09/03/20  06:01    


 


Basophils %  2.0 % (0-1.3)  H  09/03/20  06:01    


 


Absolute Neutrophils  1.5 K/uL (1.8-8.0)  L  09/03/20  06:01    


 


Absolute Lymphocytes  2.6 K/uL (0.7-4.9)   09/03/20  06:01    


 


Absolute Monocytes  0.6 K/uL (0.1-1.3)   09/03/20  06:01    


 


Absolute Eosinophils  0.1 K/uL (0-0.5)   09/03/20  06:01    


 


Absolute Basophils  0.1 K/uL (0-0.5)   09/03/20  06:01    


 


Sodium  134 mmol/L (136-145)  L  09/03/20  06:01    


 


Potassium  4.0 mmol/L (3.5-5.1)   09/03/20  06:01    


 


Chloride  100 mmol/L ()   09/03/20  06:01    


 


Carbon Dioxide  28 mmol/L (21-32)   09/03/20  06:01    


 


BUN  17 mg/dL (7-18)   09/03/20  06:01    


 


Creatinine  0.59 mg/dL (0.55-1.3)   09/03/20  06:01    


 


Estimated GFR  > 90 mL/min (=/>90)   09/03/20  06:01    


 


Glucose  81 mg/dL ()   09/03/20  06:01    


 


Calcium  8.3 mg/dL (8.5-10.1)  L  09/03/20  06:01    


 


Magnesium  2.2 mg/dL (1.8-2.4)   08/25/20  05:59    


 


Albumin  2.9 g/dL (3.4-5.0)  L  09/03/20  06:01    


 


Prealbumin  16.0 mg/dL (20-40)  L  09/03/20  06:01    


 


Urine Color  Yellow   08/24/20  15:00    


 


Urine Appearance  Clear   08/24/20  15:00    


 


Urine pH  6.5  (5.0-7.0)   08/24/20  15:00    


 


Ur Specific Gravity  1.010  (1.005-1.030)   08/24/20  15:00    


 


Glucose (UA)(Auto)  Negative  (NEG)   08/24/20  15:00    


 


Urine Ketones  Negative  (NEG)   08/24/20  15:00    


 


Urine Blood  Trace  (NEG)  H  08/24/20  15:00    


 


Urine Nitrite  Negative  (NEG)   08/24/20  15:00    


 


Urine Bilirubin  Negative  (NEG)   08/24/20  15:00    


 


Urine Urobilinogen  0.2 mg/dL (0.2-1.0)   08/24/20  15:00    


 


Ur Leukocyte Esterase  Trace  (NEG)  H  08/24/20  15:00    


 


Urine RBC  <5 /HPF (NONE SEEN)   08/24/20  15:00    


 


Urine WBC  5-10 /HPF (<5)  H  08/24/20  15:00    


 


Ur Squamous Epith Cells  5-10 /HPF (NONE SEEN)  H  08/24/20  15:00    


 


Urine Bacteria  20-50 /HPF (<20)  H  08/24/20  15:00    


 


Urine Culture Reflexed  Not needed   08/24/20  15:00    


 


Urine Total Protein  Negative  (NEG)   08/24/20  15:00    


 


SARS-CoV-2 RNA (RT-PCR)  Negative  (NEGATIVE)   09/01/20  07:20    











Weight: 143 lb 14.4 oz


Wound Present: No


Closed Surgical Incision Present: No


Negative Pressure Wound Therapy Present: No


Physician Update: Labs reviewed and are stable. She got 30/30 on her MMSE. She 

is ready for discharge today and did very well with physical and occupational 

therpy and is at modified independence. She will continue therapy with [facility].


Functional Improvement: Patient has met all short-term and long-term goals, w/ 

the exception of a car transfer.  Patient has improved well w/ technique and 

overall safety awareness.


Speech Therapy Update: Patient has made significant progress in speech therapy. 

She employs compensatory memory strategies for functional recall of pertinent or

new information at MOD I.  Patient may require intermittent supervision upon d/c

home.


Summary: Patient's care plan and long term goals have been reviewed and revised 

as necessary. Please see the Rehabilitation Signature page for all necessary 

signatures.

## 2020-09-04 NOTE — PN
Subjective:  Ms. Gutierrez is good.  Denies chest pain, nausea, vomiting.  She is going home today after 
physical therapy for back pain and right lower limb weakness, status post remote hip surgery for repl
acement.



Objective:  Vital Signs:  Stable.



Assessment/plan:  She is clinically stable to go home.  Follow up in office in about 2 weeks.





MORGAN/MEG

DD:  09/04/2020 14:25:34Voice ID:  [id number]

DT:  09/04/2020 18:12:12Report ID:  [id number]

## 2020-11-28 ENCOUNTER — HOSPITAL ENCOUNTER (EMERGENCY)
Dept: HOSPITAL 97 - ER | Age: 85
Discharge: HOME | End: 2020-11-28
Payer: COMMERCIAL

## 2020-11-28 VITALS — TEMPERATURE: 97.3 F | DIASTOLIC BLOOD PRESSURE: 65 MMHG | SYSTOLIC BLOOD PRESSURE: 106 MMHG | OXYGEN SATURATION: 97 %
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
## 2020-11-28 DIAGNOSIS — M25.551: Primary | ICD-10-CM

## 2020-11-28 DIAGNOSIS — E07.9: ICD-10-CM

## 2020-11-28 PROCEDURE — 99283 EMERGENCY DEPT VISIT LOW MDM: CPT

## 2020-11-28 NOTE — ER
Nurse's Notes                                                                                     

 [facility]                                                                 

Name: Sharonda Gutierrez                                                                              

Age: 88 yrs                                                                                       

Sex: Female                                                                                       

: 1932                                                                                   

MRN: [id number]                                                                                   

Arrival Date: 2020                                                                          

Time: 11:45                                                                                       

Account#: [id number]                                                                            

Bed 5                                                                                             

Private MD: Aaron Núñez V                                                                      

Diagnosis: Pain in right hip                                                                      

                                                                                                  

Presentation:                                                                                     

                                                                                             

12:12 Chief complaint: Patient states: Tripped and fell backwards on Tuesday. Denies LOC. Hit ca1 

      head, but not sore. Reports mid and low back pain mostly on the R side. Had a R hip         

      replacement in the past and right above that is where it hurt. Coronavirus screen:          

      Client denies travel out of the [place] in the last 14 days. At this time, the client does     

      not indicate any symptoms associated with coronavirus-19. Ebola Screen: Patient             

      negative for fever greater than or equal to 101.5 degrees Fahrenheit, and additional        

      compatible Ebola Virus Disease symptoms Patient denies exposure to infectious person.       

      Patient denies travel to an Ebola-affected area in the 21 days before illness onset. No     

      symptoms or risks identified at this time. Initial Sepsis Screen: Does the patient meet     

      any 2 criteria? No. Patient's initial sepsis screen is negative. Does the patient have      

      a suspected source of infection? No. Patient's initial sepsis screen is negative. Risk      

      Assessment: Do you want to hurt yourself or someone else? Patient reports no desire to      

      harm self or others. Onset of symptoms was 2020.                               

12:12 Method Of Arrival: Ambulatory                                                           ca1 

12:12 Acuity: ALLAN 4                                                                           ca1 

                                                                                                  

Historical:                                                                                       

- Allergies:                                                                                      

12:19 No Known Allergies;                                                                     ca1 

- Home Meds:                                                                                      

12:19 levothyroxine oral [Active];                                                            ca1 

- PMHx:                                                                                           

12:19 Thyroid problem; Hyperlipidemia;                                                        ca1 

- PSHx:                                                                                           

12:19 Hip Replacement;                                                                        ca1 

                                                                                                  

- Immunization history:: Adult Immunizations up to date, Pneumococcal vaccine is up to            

  date, Flu vaccine is up to date.                                                                

- Social history:: Smoking status: Patient denies any tobacco usage or history of.                

                                                                                                  

                                                                                                  

Screenin:45 Abuse screen: Denies threats or abuse. Denies injuries from another. Nutritional        jl7 

      screening: No deficits noted. Tuberculosis screening: No symptoms or risk factors           

      identified. Fall Risk None identified.                                                      

                                                                                                  

Assessment:                                                                                       

12:45 General: Appears in no apparent distress. uncomfortable, Behavior is agitated. Pain:    jl7 

      Complains of pain in low back area. Neuro: Level of Consciousness is awake, alert,          

      obeys commands, Oriented to person, place, time, situation. Cardiovascular: Patient's       

      skin is warm and dry. Respiratory: Airway is patent Respiratory effort is even,             

      unlabored, Respiratory pattern is regular, symmetrical. Derm: Skin is pink, warm \T\ dry.   

      Musculoskeletal: Reports pain in low back area.                                             

13:35 Reassessment: Patient appears in no apparent distress at this time. No changes from     tw2 

      previously documented assessment. Patient and/or family updated on plan of care and         

      expected duration. Pain level reassessed. Patient is alert, oriented x 3, equal             

      unlabored respirations, skin warm/dry/pink.                                                 

                                                                                                  

Vital Signs:                                                                                      

12:12  / 65; Pulse 67; Resp 18 S; Temp 97.3(TE); Pulse Ox 97% on R/A; Weight 63.5 kg    ca1 

      (R); Height 5 ft. 6 in. (167.64 cm) (R); Pain 8/10;                                         

12:12 Body Mass Index 22.60 (63.50 kg, 167.64 cm)                                             ca1 

                                                                                                  

ED Course:                                                                                        

11:45 Patient arrived in ED.                                                                  mr  

11:45 Aaron Núñez MD is Private Physician.                                                 mr  

12:16 Triage completed.                                                                       ca1 

12:19 Arm band placed on right wrist.                                                         ca1 

12:21 Bernard Suero, RN is Primary Nurse.                                                      jl7 

12:24 Austin Alexander PA is PHCP.                                                              jmm 

12:24 Franklyn Nicholson MD is Attending Physician.                                                m 

12:45 Patient has correct armband on for positive identification. Bed in low position. Call   jl7 

      light in reach. Side rails up X 1.                                                          

13:11 Hip Right 2 View XRAY In Process Unspecified.                                           EDMS

13:21 Aaron Núñez MD is Referral Physician.                                                jmm 

13:33 No provider procedures requiring assistance completed. Patient did not have IV access   jl7 

      during this emergency room visit.                                                           

                                                                                                  

Administered Medications:                                                                         

No medications were administered                                                                  

                                                                                                  

                                                                                                  

Outcome:                                                                                          

13:22 Discharge ordered by MD.                                                                jmm 

13:35 Discharged to home ambulatory, with pts own walker                                      tw2 

13:35 Condition: stable                                                                           

13:35 Discharge instructions given to patient, Instructed on discharge instructions, follow       

      up and referral plans. Demonstrated understanding of instructions, follow-up care.          

13:35 Patient left the ED.                                                                    tw2 

                                                                                                  

Signatures:                                                                                       

Dispatcher [employer]                           EDMS                                                 

Austin Alexander PA PA jmm Rivera, Mary                                 mr                                                   

Shauna Chicas RN                          RN   tw2                                                  

Bernard Suero RN                        RN   jl7                                                  

Carley Lester RN                        RN   ca1                                                  

                                                                                                  

**************************************************************************************************

## 2020-11-28 NOTE — RAD REPORT
EXAM DESCRIPTION:  RAD - Hip Right 2 View - 11/28/2020 1:11 pm

 

CLINICAL HISTORY:  hip pain

Fall, right-sided pain

 

COMPARISON:  Hip Right 2 View dated 8/20/2020

 

FINDINGS:  Right total hip arthroplasty noted. No hardware abnormality seen. No acute fracture or sub
luxation evident. Mild atherosclerosis.

## 2020-11-28 NOTE — EDPHYS
Physician Documentation                                                                           

 [facility]                                                                 

Name: Sharonda Gutierrez                                                                              

Age: 88 yrs                                                                                       

Sex: Female                                                                                       

: 1932                                                                                   

MRN: [id number]                                                                                   

Arrival Date: 2020                                                                          

Time: 11:45                                                                                       

Account#: [id number]                                                                            

Bed 5                                                                                             

Private MD: Aaron Núñez V                                                                      

ED Physician Franklyn Nicholson                                                                         

HPI:                                                                                              

                                                                                             

12:37 This 88 yrs old  Female presents to ER via Ambulatory with complaints of Fall  jmm 

      Injury.                                                                                     

12:37 Details of fall: The patient fell from an upright position, while walking. Onset: The   jmm 

      symptoms/episode began/occurred acutely, 4 day(s) ago. This is an 88 year old female        

      with a history of hlp that presents to the ED with complaints of right hip pain             

      beginning after a fall which occurred this past Tuesday. States she hit her head but        

      denies loc, vomiting. Patient states she is still able to walk with a walker. .             

                                                                                                  

Historical:                                                                                       

- Allergies:                                                                                      

12:19 No Known Allergies;                                                                     ca1 

- Home Meds:                                                                                      

12:19 levothyroxine oral [Active];                                                            ca1 

- PMHx:                                                                                           

12:19 Thyroid problem; Hyperlipidemia;                                                        ca1 

- PSHx:                                                                                           

12:19 Hip Replacement;                                                                        ca1 

                                                                                                  

- Immunization history:: Adult Immunizations up to date, Pneumococcal vaccine is up to            

  date, Flu vaccine is up to date.                                                                

- Social history:: Smoking status: Patient denies any tobacco usage or history of.                

                                                                                                  

                                                                                                  

ROS:                                                                                              

12:37 Constitutional: Negative for fever, chills, and weight loss, Cardiovascular: Negative   jmm 

      for chest pain, palpitations, and edema, Respiratory: Negative for shortness of breath,     

      cough, wheezing, and pleuritic chest pain.                                                  

12:37 MS/extremity: Positive for pain.                                                            

12:37 All other systems are negative.                                                             

                                                                                                  

Exam:                                                                                             

12:37 Constitutional:  This is a well developed, well nourished patient who is awake, alert,  jmm 

      and in no acute distress.                                                                   

12:37 Eyes:  EOMI, no conjunctival erythema appreciated ENT:  Moist Mucus Membranes Neck:         

      Trachea midline, Supple Chest/axilla:  Normal chest wall appearance and motion.             

      Cardiovascular:  Regular rate and rhythm.  No edema appreciated Respiratory:  Normal        

      respirations, no respiratory distress appreciated Abdomen/GI:  Non distended, soft          

      Back:  Normal ROM Skin:  General appearance color normal                                    

12:37 Head/face: Exam is negative for holder signs, hematoma, raccoon eyes.                       

12:37 Musculoskeletal/extremity: FROM noted to the right and left hip, no pelvis pain on          

      palpation, full dorsalis pulse bilaterally, NVI.                                            

12:37 Skin: Appearance: Color: normal in color.                                                   

12:37 Neuro: Orientation: is normal, Mentation: is normal, Memory: is normal.                     

12:37 Psych: Behavior/mood is pleasant, cooperative.                                              

                                                                                                  

Vital Signs:                                                                                      

12:12  / 65; Pulse 67; Resp 18 S; Temp 97.3(TE); Pulse Ox 97% on R/A; Weight 63.5 kg    ca1 

      (R); Height 5 ft. 6 in. (167.64 cm) (R); Pain 8/10;                                         

12:12 Body Mass Index 22.60 (63.50 kg, 167.64 cm)                                             ca1 

                                                                                                  

MDM:                                                                                              

12:37 Patient medically screened.                                                             [facility] 

13:19 Data reviewed: vital signs, nurses notes. Counseling: I had a detailed discussion with  kei 

      the patient and/or guardian regarding: the historical points, exam findings, and any        

      diagnostic results supporting the discharge/admit diagnosis, radiology results, the         

      need for outpatient follow up. ED course: Patient advised to follow up with ortho for       

      further evaluation. Patient is advised to follow up with ortho for further evaluation.      

      Patient understood and agrees with the plan of care. .                                      

                                                                                                  

                                                                                             

12:45 Order name: Hip Right 2 View XRAY; Complete Time: 13:19                                 [facility] 

                                                                                                  

Administered Medications:                                                                         

No medications were administered                                                                  

                                                                                                  

                                                                                                  

Disposition:                                                                                      

13:45 Co-signature as Attending Physician, Franklyn Nicholson MD.                                    rn  

                                                                                                  

Disposition:                                                                                      

20 13:22 Discharged to Home. Impression: Pain in right hip.                                 

- Condition is Stable.                                                                            

- Discharge Instructions: Hip Pain.                                                               

                                                                                                  

- Medication Reconciliation Form, Thank You Letter, Antibiotic Education, Prescription            

  Opioid Use form.                                                                                

- Follow up: Aaron Núñez MD; When: 2 - 3 days; Reason: Recheck today's complaints,             

  Continuance of care, Re-evaluation by your physician.                                           

                                                                                                  

                                                                                                  

                                                                                                  

Signatures:                                                                                       

Dispatcher [employer]                           EDMS                                                 

Austin Alexander PA PA jmm Nieto, Roman, MD MD rn Wise, Tara, RN                          RN   tw2                                                  

Carley Lester RN                        RN   ca1                                                  

                                                                                                  

Corrections: (The following items were deleted from the chart)                                    

13:35 13:22 2020 13:22 Discharged to Home. Impression: Pain in right hip. Condition is  tw2 

      Stable. Forms are Medication Reconciliation Form, Thank You Letter, Antibiotic              

      Education, Prescription Opioid Use. Follow up: Aaron Núñez; When: 2 - 3 days; Reason:      

      Recheck today's complaints, Continuance of care, Re-evaluation by your physician. kei       

                                                                                                  

**************************************************************************************************

## 2020-11-28 NOTE — XMS REPORT
Continuity of Care Document

                          Created on:2020



Patient:HENRIETTA MATIAS

Sex:Female

:1932

External Reference #:[id number]





Demographics







                          Address                   [address]



                                                    [place] [address]

 

                          Home Phone                [phone]

 

                          Work Phone                [phone]

 

                          Mobile Phone              [phone]

 

                          Email Address             [email or web address]

 

                          Preferred Language        English

 

                          Marital Status            Unknown

 

                          Cheondoism Affiliation     Unknown

 

                          Race                      Unknown

 

                          Additional Race(s)        Unavailable



                                                    White

 

                          Ethnic Group              Not  or 









Author







                          Organization              [facility]

t

 

                          Address                   [address]



                                                    [place] [address]

 

                          Phone                     [phone]









Support







                Name            Relationship    Address         Phone

 

                BRENDA           Unavailable     [address] [phone]



                                                [place] [address] 

 

                BRENDA           Unavailable     [address] [phone]



                                                [place] [address] 

 

                Contact         Unavailable     [address] [place] [phone]



                                                [place] [address] 

 

                Brenda           Child           Unavailable     [phone]









Care Team Providers







                    Name                Role                Phone

 

                    Mikey NOVAK              Attending Clinician [phone]

 

                    FRANCI WYATT            Attending Clinician Unavailable

 

                    SEES                Attending Clinician Unavailable

 

                    DEVAN GERONIMO           Attending Clinician Unavailable

 

                    ROSAS SHULTZ          Attending Clinician Unavailable

 

                    TRACY                Attending Clinician Unavailable

 

                    FRANCI WYATT            Admitting Clinician Unavailable

 

                    SEES                Admitting Clinician Unavailable

 

                    DEVAN              Admitting Clinician Unavailable

 

                    DINORAH               Admitting Clinician Unavailable

 

                    ROSAS SHULTZ          Admitting Clinician Unavailable

 

                    TRACY                Admitting Clinician Unavailable









Payers







           Payer Name Policy Type Policy Number Effective Date Expiration Date S

ource







Problems







       Condition Condition Condition Status Onset  Resolution Last   Treating Co

mments 

Source



       Name   Details Category        Date   Date   Treatment Clinician        



                                                 Date                 

 

       Hypothyroi Hypothyroi Problem Active                                    [facility]

[facility]



       dism   dism                                                    [facility]



                                                                      [facility]



                                                                      (LUF/LI



                                                                      V/SA)

 

       L3     L3     Problem Active                                    [facility]



       FRACTURE FRACTURE                                                  [facility]



                                                                      [facility]



                                                                      (LUF/LI



                                                                      V/SA)

 

       Hyperchole Hyperchole Problem Active                                    [facility]

[facility]



       sterolemia sterolemia                                                  [facility]



                                                                      [facility]



                                                                      (LUF/LI



                                                                      V/SA)

 

       Backache Backache Problem Active                                    [facility]

[facility]



                                                                      [facility]



                                                                      [facility]



                                                                      (LUF/LI



                                                                      V/SA)

 

       Arthritis Arthritis Problem Active                                    [facility]



                                                                      [facility]



                                                                      (LUF/LI



                                                                      V/SA)

 

       Vertigo Vertigo Problem Active                                    [facility]



                                                                      [facility]



                                                                      (LUF/LI



                                                                      V/SA)







Allergies, Adverse Reactions, Alerts







       Allergy Allergy Status Severity Reaction(s) Onset  Inactive Treating Comm

ents 

Source



       Name   Type                        Date   Date   Clinician        

 

       No Known DA     Active U                                   [facility]



       Contrast                                                     [place]



       Allergie                             00:00:                      [address]

 

       No Known DA     Active U                                   [facility]



       Drug                                                       [place]



       Allergie                             00:00:                      [address]

 

       No Known DA     Active U                                   [facility]



       Food                                                       [place] [facility]



       Allergie                             00:00:                      [address]

 

       No Known DA     Active U                                   [facility]



       Other                                                      [place]



       Allergie                             00:00:                      [address]







Social History







                Smoking Status  Start Date      Stop Date       Source

 

                Never smoker                                    [facility] (LUF/PATRICK/SA)







Medications







       Ordered Filled Start  Stop   Current Ordering Indication Dosage Frequency

 Signature

                    Comments            Components          Source



     Medication Medication Date Date Medication? Clinician                (SIG) 

          



     Name Name                                                   

 

     atorvastati atorvastati           Yes            20mg                     [facility]

[facility]



     n 20 MG n 20 MG                                                   [facility]



     Oral Tablet Oral Tablet                                                   [facility]

[facility]



                                                                 l



                                                                 (LUF/LI



                                                                 V/SA)

 

     Levothyroxi Levothyroxi           Yes            37.5mcg QD                

  [facility]



     ne Oral ne Oral                                                   [facility]



                                                                 [facility]



                                                                 (LUF/LI



                                                                 V/SA)

 

     MAGNESIUM MAGNESIUM           Yes            30mg QD                  [facility]

[facility]



     GLUCONATE GLUCONATE                                                   [facility]



     550 MG Oral 550 MG Oral                                                   [facility]

[facility]



     Tablet Tablet                                                   l



                                                                 (LUF/LI



                                                                 V/SA)

 

     Meclizine Meclizine           Yes       vertigo 25mg                     [facility]

[facility]



     Hydrochlori Hydrochlori                                                   [facility]

[facility]



     de 25 MG de 25 MG                                                   [facility]



     Oral Tablet Oral Tablet                                                   l



                                                                 (LUF/LI



                                                                 V/SA)

 

     Multivitami Multivitami           Yes            1tab-ca QD                

  [facility]



     ns   ns                            p                        [facility]



                                                                 [facility]



                                                                 (LUF/LI



                                                                 V/SA)







Immunizations







           Ordered Immunization Filled Immunization Date       Status     Commen

ts   Source



           Name       Name                                        

 

           FLU SHOT   FLU SHOT   2014-10-10 Completed             [facility]



                                 [facility]



                                                                  (LUF/PATRICK/SA)

 

           PNEUMOVAX  PNEUMOVAX  2000-10-10 Completed             [facility]



                                 00[facility]



                                                                  (F/PATRICK/SA)







Procedures

This patient has no known procedures.



Encounters







        Start   End     Encounter Admission Attending Care    Care    Encounter 

Source



        Date/Time Date/Time Type    Type    Clinicians Facility Department ID   

   

 

        2020 Telemedici         Mikey    [facility]    [id number]

[id number] 



        08:00:31 08:20:31 ne Visit         Reuben Rodriguez [id number]        

 



                                                Roll 4.2.7.2.686         



                                                Professio [phone]         



                                                nal     9             



                                                [facility]                 

 

        2020 Office          Mikey    [facility]    [id number]

[id number] 



        10:32:48 11:47:08 Visit           Reuben Rodriguez [id number]         



                                                Roll 4.2.7.2.686         



                                                Professio [phone]         



                                                nal     9             



                                                [facility]                 

 

        2019 PAIN IN 3       HARTMAN, [employer]     [id number]

 [facility]



        15:57:00 23:59:00 RIGHT FOOT         [facility]       

  [facility] -



                                                [address]                 [facility]



                                                [address]                  [address]



                                                [address],                 (LUF/LI



                                                [place]                 V/SA)



                                                [place], [place]                   



                                                [address]                   

 

        2018 OCCLUSION 3       SEES, PARVIN [employer]     [id number]

[id number] [facility]



        13:21:00 23:59:00 & STENOS                 [facility]



                        ABI                     [address]                 [facility]



                        [address]                 [address]                  [address]



                        ART                     [place],                 (LUF/LI



                                                [place]                 V/SA)



                                                [place], [place]                   



                                                [address]                   

 

        2017 ENC SCR 3       COPELAND,  [employer]     [id number]

 [facility]



        10:41:00 23:59:00 MAMMO           [facility]         L

[facility] -



                        MALIG                   [address]                 [facility]



                        [address]                 [address]                  [address]



                        BREAST                  [place],                 (LUF/LI



                                                [place]                 V/SA)



                                                [place], [place]                   



                                                [address]                   







Results







           Test Description Test Time  Test Comments Results    Result     Sourc

e



                                                       Comments   

 

           HISTOLOGY  2019                                  



           [place] 12:21:00              [address] [place] [address]            



                                            [id number]:            



                                            [phone]       Fax:            



                                            [phone] #:            



                                            [id number]   Medical            



                                            Director: Parvin Barker M.D.Surgical            



                                            Pathology Consultation            



                                            ReportPatient Name:            



                                            HENRIETTA MATIAS            



                                            Case #: [id number] Med.            



                                            Rec.                  



                                            #:[id number]:            



                                            PATRICK-PATRICK Surgery Date:            



                                            2019 :            



                                            1932 (Age:            



                                            87)Account #:[id number]            



                                            Received: 2019            



                                            Gender:  F Copy to :            



                                            Reported:             



                                            2019Physician(s):            



                                            Justin Steward            



                                            Specimen(s) ReceivedA:            



                                            Femoral head, right,            



                                            fracture Final            



                                            Pathologic            



                                            DiagnosisBone, right            



                                            femoral head:-            



                                            changes compatible with            



                                            fracture SITE and            



                                            osteoporosis,            



                                            noevidencemalignancy.            



                                                ***Electronically            



                                            Signed Out***            



                                            /2019 Anu Parra MD, Board            



                                            Certified in Anatomic            



                                            Pathology Clinical            



                                            HistoryRight hip            



                                            fracture.Gross            



                                            DescriptionSpecimen            



                                            labeled right femoral            



                                            head consists of a            



                                            femoral head            



                                            withportion offemoral            



                                            neck measuring 7.5 x            



                                            4.5 x 4.5 cm.  The            



                                            articular surface            



                                            showsareasof            



                                            eburnation.  The            



                                            trabecular bone is            



                                            hemorrhagic.            



                                            Additionally in            



                                            thecontainer, there are            



                                            two fragments of bone            



                                            measuring 4 x 1.5 x 1            



                                            and 4 x2.5 x1.5 cm.            



                                            Representative sections            



                                            are submitted or            



                                            permanent sections            



                                            inacassette labeled A1            



                                            after decalcification.            



                                            /2019 Anu Parra MD, Board            



                                            Certified in Anatomic            



                                            Pathology  Microscopic            



                                            DescriptionBone with            



                                            thin                  



                                            fragmented/necrotic            



                                            trabeculae with            



                                            intertrabecularhemorrha            



                                            ge.The surface            



                                            cartilage shows            



                                            degenerative changes            



                                            with fibrillation            



                                            ofthesurface and            



                                            clonation of            



                                            chondrocytes.  There is            



                                            no evidence            



                                            malignancy.Billing Fee            



                                            Code(s): A; 62172,            



                                            58569                 

 

           CULTURE, URINE 2019            Specimen: Urine            



                      07:36:00              SpecimensCollected:            



                                            2019 09:38            



                                            Status: Final      Last            



                                            Updated: 2019            



                                            07:36          Culture            



                                            Result (Final) (Final)            



                                              >100,000 cc/mL Gram            



                                            Negative Bacilli            



                                            Isolate (Final) (C)            



                                            (Final)               



                                            ***Escherichia coli            



                                            ESBL***    Strains of            



                                            Klebsiella spp. and            



                                            Escherichia coli may be            



                                            clinically    resistant            



                                            to cephalosporin and            



                                            aztreonam therapy by            



                                            virtue of extended-            



                                            spectrum B-lactamase            



                                            (ESBL) production,            



                                            despite apparent in            



                                            vitro    susceptibility            



                                            to some of these            



                                            agents.   Multi-drug            



                                            resistantorganism            



                                            isolated.             



                                            Recommendation------Iso            



                                            lation protocol.            



                                            Amikacin           <=2            



                                                 S      Ampicillin            



                                                   >=32      R            



                                            Ampicillin/Sulbac  >=32            



                                                 R      Cefazolin            



                                                   >=64      R            



                                            Cefepime           <=1            



                                                 S      Cefoxitin            



                                                   8         S            



                                            Ceftazidime        <=1            



                                                 S      Ceftriaxone            



                                                   <=1       S            



                                            Ciprofloxacin            



                                            <=0.25    S            



                                            Gentamicin         <=1            



                                                 S                



                                            Levofloxacin            



                                            <=0.12    S            



                                            Meropenem             



                                            <=0.25    S            



                                            Nitrofurantoin     128            



                                                 R                



                                            Piperacillin/Tazo  <=4            



                                                 S      Tobramycin            



                                                   <=1       S            



                                            Trimeth/Sulfa      <=20            



                                                 S      ESBL            



                                                   Positive  +            

 

           XR HIP COMP 2-3 2019            Right hip 2            



           views      16:45:16              views:History: Right            



                                            hip painAP and frog-leg            



                                            lateral views of the            



                                            right hip were            



                                            obtained. Comparison is            



                                            madeto 19. A right            



                                            hip replacement is            



                                            again noted. The            



                                            hardware is intact            



                                            andis unchanged in            



                                            position. No new            



                                            findings are noted. The            



                                            lesser trochanter            



                                            againprojects is a            



                                            separate fragment            



                                            medial to the            



                                            prosthesis as on the            



                                            prior study.There is            



                                            some residual            



                                            postoperative air in            



                                            the soft tissues about            



                                            the hip.Impression:            



                                            Stable changes of            



                                            recent right hip            



                                            arthroplasty.This final            



                                            report was            



                                            electronically signed            



                                            by Dr Lokesh Esposito MD [id number]            



                                            PMDictated By: LOKESH ESPOSITODate:            



                                            2019 16:38            









                    RBC, Leukoreduced   2019 12:19:00 









                      Test Item  Value      Reference Range Interpretation Comme

nts









             RBC Unit (test code = RBCUNIT) Released for Transfusion            

               



CROSSMATCH x  11:58:00





             Test Item    Value        Reference Range Interpretation Comments

 

             Crossmatch (test code = Completed: Compatible                      

     



             XMATCH)                                             



TYPE &amp; [id number] 07:24:00





             Test Item    Value        Reference Range Interpretation Comments

 

             ABO Blood Type (test code = ABO) A                                 

     

 

             Rh (test code = RH) Positive                               

 

             Antibody Screen (test code = ABSCR) Negative     Negative     N    

        



[id number] 05:44:00





             Test Item    Value        Reference Range Interpretation Comments

 

             Glucose (test code 111 mg/dl           H            



             = GLU)                                              

 

             BUN (test code = 14.0 mg/dl   6.0-17.0                  



             BUN)                                                

 

             Creatinine (test 0.6 mg/dl    0.4-1.2                   



             code = CREA)                                        

 

             Sodium (test code = 131 mmol/l   137-145      L            



             NA)                                                 

 

             Potassium (test 4.5 mmol/l   3.5-5.0                   



             code = K)                                           

 

             Chloride (test code 100 mmol/l                       



             = CL)                                               

 

             CO2 (test code = 25 mmol/l    22-30                     



             CO2)                                                

 

             Calcium (test code 8.1 mg/dl    8.4-10.2     L            



             = CALC)                                             

 

             EGFR if  >60                                    



             American (test code mL/min/1.73m\                           



             = EGFRAA)    S\2                                    

 

             EGFR if Non- >60                                    Estimate

d Glomerular



             American (test code mL/min/1.73m\                           Filtrat

ion Rate (eGFR)



             = EGFRNA)    S\2                                    Reference Inter

vals



                                                                 Decision Points

 for 18



                                                                 years and older

 and



                                                                 average body ma

ss:



                                                                  >=  60



                                                                    Does not exc

lude



                                                                 kidney disease.

 30 -



                                                                 59



                                                                 Suggests modera

te



                                                                 chronic kidney 

disease



                                                                 and



                                                                         indicat

es the



                                                                 need for furthe

r



                                                                 investigation



                                                                 



                                                                 including asses

sment



                                                                 of proteinuria 

and



                                                                 



                                                                 



                                                                 cardiovascular



                                                                 factors. < 30



                                                                      Usually in

dicates



                                                                 a need for refe

rral



                                                                 for assessment



                                                                                

    and



                                                                 management of c

hronic



                                                                 kidney failure.



CBC WITH AUTO [id number] 05:40:00





             Test Item    Value        Reference Range Interpretation Comments

 

             WBC (test code = WBC) 10.32 10\S\3/ul 4.80-10.80                

 

             RBC (test code = RBC) 2.52 10\S\6/ul 4.20-5.40    L            

 

             Hemoglobin (test code = HGB) 7.8 gm/dl    12.0-14.0    L           

 

 

             Hematocrit (test code = HCT) 23.2 %       37.0-47.0    L           

 

 

             MCV (test code = MCV) 92.1 fL      81.0-99.0                 

 

             MCH (test code = MCH) 31.0 pg      27.0-31.0                 

 

             MCHC (test code = MCHC) 33.6 gm/dl   33.0-37.0                 

 

             RDW (test code = RDWVC) 13.9 %       11.5-14.5                 

 

             Platelet (test code = PLT) 221 10\S\3/ul 130-400                   

 

             MPV (test code = MPV) 9.3 fL       7.4-10.4     A            

 

             NE% (test code = NE) 68.9 %       42.0-75.0                 

 

             LY% (test code = LY) 18.3 %       13.0-42.0                 

 

             MO% (test code = MO) 10.5 %       4.0-14.0                  

 

             EO% (test code = EO) 1.2 %        1.0-5.0                   

 

             BA% (test code = BA) 0.5 %        0.0-3.0                   

 

             IG% (test code = IG%) 0.6 %        0.0-0.4      H            



[id number] 05:39:00





             Test Item    Value        Reference Range Interpretation Comments

 

             Glucose (test code 141 mg/dl           H            



             = GLU)                                              

 

             BUN (test code = 10.0 mg/dl   6.0-17.0                  



             BUN)                                                

 

             Creatinine (test 0.5 mg/dl    0.4-1.2                   



             code = CREA)                                        

 

             Sodium (test code = 132 mmol/l   137-145      L            



             NA)                                                 

 

             Potassium (test 3.7 mmol/l   3.5-5.0                   



             code = K)                                           

 

             Chloride (test code 100 mmol/l                       



             = CL)                                               

 

             CO2 (test code = 26 mmol/l    22-30                     



             CO2)                                                

 

             Calcium (test code 7.9 mg/dl    8.4-10.2     L            



             = CALC)                                             

 

             EGFR if  >60                                    



             American (test code mL/min/1.73m\                           



             = EGFRAA)    S\2                                    

 

             EGFR if Non- >60                                    Estimate

d Glomerular



             American (test code mL/min/1.73m\                           Filtrat

ion Rate (eGFR)



             = EGFRNA)    S\2                                    Reference Inter

vals



                                                                 Decision Points

 for 18



                                                                 years and older

 and



                                                                 average body ma

ss:



                                                                  >=  60



                                                                    Does not exc

lude



                                                                 kidney disease.

 30 -



                                                                 59



                                                                 Suggests modera

te



                                                                 chronic kidney 

disease



                                                                 and



                                                                         indicat

es the



                                                                 need for furthe

r



                                                                 investigation



                                                                 



                                                                 including asses

sment



                                                                 of proteinuria 

and



                                                                 



                                                                 



                                                                 cardiovascular



                                                                 factors. < 30



                                                                      Usually in

dicates



                                                                 a need for refe

rral



                                                                 for assessment



                                                                                

    and



                                                                 management of c

hronic



                                                                 kidney failure.



CBC WITH AUTO [id number] 05:20:00





             Test Item    Value        Reference Range Interpretation Comments

 

             WBC (test code = 13.64        4.80-10.80   H            



             WBC)         10\S\3/ul                              

 

             RBC (test code = 2.83 10\S\6/ul 4.20-5.40    L            



             RBC)                                                

 

             Hemoglobin (test 8.9 gm/dl    12.0-14.0    L            



             code = HGB)                                         

 

             Hematocrit (test 26.8 %       37.0-47.0    L            



             code = HCT)                                         

 

             MCV (test code = 94.7 fL      81.0-99.0                 



             MCV)                                                

 

             MCH (test code = 31.4 pg      27.0-31.0    H            



             MCH)                                                

 

             MCHC (test code = 33.2 gm/dl   33.0-37.0                 



             MCHC)                                               

 

             RDW (test code = 13.6 %       11.5-14.5                 



             RDWVC)                                              

 

             Platelet (test code 243 10\S\3/ul 130-400                   



             = PLT)                                              

 

             MPV (test code = 9.1 fL       7.4-10.4     A            "NOT MEASUR

ED"



             MPV)                                                RESULTS ARE DIS

PLAYED



                                                                 WHEN THE INSTRU

MENT



                                                                 HAS A SUPPRESSE

D OR



                                                                 UNREPORTABLE RE

SULT.



                                                                 THIS WILL MOST 

OFTEN



                                                                 HAPPEN WITH THE

 [facility]



                                                                 WHEN THERE IS A

N



                                                                 ABNORMAL PLATEL

ET



                                                                 DISTRIBUTION DU

E TO A



                                                                 CRITICAL LOW VA

LUE OR



                                                                 PLATELET CLUMPI

NG.



                                                                 THE RDW MAY BE



                                                                 SUPPRESSED IF T

HERE



                                                                 ARE MULTIPLE PE

AKS



                                                                 PRESENT ON THE 

RBC



                                                                 HISTOGRAM.  IN 

THIS



                                                                 CASE, A MANUAL 

REVIEW



                                                                 OF THE SLIDE WI

LL BE



                                                                 PERFORMED, AND 

RBC



                                                                 MORPHOLOGY WILL

 BE



                                                                 NOTED ON THE RE

PORT.

 

             NE% (test code = 70.3 %       42.0-75.0                 



             NE)                                                 

 

             LY% (test code = 17.7 %       13.0-42.0                 



             LY)                                                 

 

             MO% (test code = 10.6 %       4.0-14.0                  



             MO)                                                 

 

             EO% (test code = 0.2 %        1.0-5.0      L            



             EO)                                                 

 

             BA% (test code = 0.4 %        0.0-3.0                   



             BA)                                                 

 

             IG% (test code = 0.8 %        0.0-0.4      H            



             IG%)                                                



[id number] 05:24:00





             Test Item    Value        Reference Range Interpretation Comments

 

             Glucose (test code 111 mg/dl           H            



             = GLU)                                              

 

             BUN (test code = 10.0 mg/dl   6.0-17.0                  



             BUN)                                                

 

             Creatinine (test 0.6 mg/dl    0.4-1.2                   



             code = CREA)                                        

 

             Sodium (test code = 134 mmol/l   137-145      L            



             NA)                                                 

 

             Potassium (test 4.4 mmol/l   3.5-5.0                   



             code = K)                                           

 

             Chloride (test code 102 mmol/l                       



             = CL)                                               

 

             CO2 (test code = 28 mmol/l    22-30                     



             CO2)                                                

 

             Calcium (test code 7.7 mg/dl    8.4-10.2     L            



             = CALC)                                             

 

             EGFR if  >60                                    



             American (test code mL/min/1.73m\                           



             = EGFRAA)    S\2                                    

 

             EGFR if Non- >60                                    Estimate

d Glomerular



             American (test code mL/min/1.73m\                           Filtrat

ion Rate (eGFR)



             = EGFRNA)    S\2                                    Reference Inter

vals



                                                                 Decision Points

 for 18



                                                                 years and older

 and



                                                                 average body ma

ss:



                                                                  >=  60



                                                                    Does not exc

lude



                                                                 kidney disease.

 30 -



                                                                 59



                                                                 Suggests modera

te



                                                                 chronic kidney 

disease



                                                                 and



                                                                         indicat

es the



                                                                 need for furthe

r



                                                                 investigation



                                                                 



                                                                 including asses

sment



                                                                 of proteinuria 

and



                                                                 



                                                                 



                                                                 cardiovascular



                                                                 factors. < 30



                                                                      Usually in

dicates



                                                                 a need for refe

rral



                                                                 for assessment



                                                                                

    and



                                                                 management of c

hronic



                                                                 kidney failure.



CBC WITH AUTO [id number] 05:07:00





             Test Item    Value        Reference Range Interpretation Comments

 

             WBC (test code = 7.73 10\S\3/ul 4.80-10.80                



             WBC)                                                

 

             RBC (test code = 2.89 10\S\6/ul 4.20-5.40    L            



             RBC)                                                

 

             Hemoglobin (test 8.9 gm/dl    12.0-14.0    L            



             code = HGB)                                         

 

             Hematocrit (test 27.2 %       37.0-47.0    L            



             code = HCT)                                         

 

             MCV (test code = 94.1 fL      81.0-99.0                 



             MCV)                                                

 

             MCH (test code = 30.8 pg      27.0-31.0                 



             MCH)                                                

 

             MCHC (test code = 32.7 gm/dl   33.0-37.0    L            



             MCHC)                                               

 

             RDW (test code = 13.5 %       11.5-14.5                 



             RDWVC)                                              

 

             Platelet (test code 214 10\S\3/ul 130-400                   



             = PLT)                                              

 

             MPV (test code = 9.1 fL       7.4-10.4     A            "NOT MEASUR

ED"



             MPV)                                                RESULTS ARE DIS

PLAYED



                                                                 WHEN THE INSTRU

MENT



                                                                 HAS A SUPPRESSE

D OR



                                                                 UNREPORTABLE RE

SULT.



                                                                 THIS WILL MOST 

OFTEN



                                                                 HAPPEN WITH THE

 [facility]



                                                                 WHEN THERE IS A

N



                                                                 ABNORMAL PLATEL

ET



                                                                 DISTRIBUTION DU

E TO A



                                                                 CRITICAL LOW [facility]

LUE OR



                                                                 PLATELET CLUMPI

NG.



                                                                 THE RDW MAY BE



                                                                 SUPPRESSED IF T

HERE



                                                                 ARE MULTIPLE PE

AKS



                                                                 PRESENT ON THE 

RBC



                                                                 HISTOGRAM.  IN 

THIS



                                                                 CASE, A MANUAL 

REVIEW



                                                                 OF THE SLIDE WI

LL BE



                                                                 PERFORMED, AND 

RBC



                                                                 MORPHOLOGY WILL

 BE



                                                                 NOTED ON THE RE

PORT.

 

             NE% (test code = 59.7 %       42.0-75.0                 



             NE)                                                 

 

             LY% (test code = 26.1 %       13.0-42.0                 



             LY)                                                 

 

             MO% (test code = 12.8 %       4.0-14.0                  



             MO)                                                 

 

             EO% (test code = 0.4 %        1.0-5.0      L            



             EO)                                                 

 

             BA% (test code = 0.6 %        0.0-3.0                   



             BA)                                                 

 

             IG% (test code = 0.4 %        0.0-0.4                   



             IG%)                                                



XR HIP [id number] 18:25:40Right hip 2 - viewsHISTORY: Fracture.COMPARISON: 
2019 at 11:07 AM.FINDINGS:Intraoperative film demonstrating right 
hip replacement and ORIF ofintertrochanteric fracture in progress. Overlying 
artifact. Vascularcalcifications. Remote fractures of the left superior inferior
pubic rami.IMPRESSION:Intraoperative film demonstrating right hip replacement 
and ORIF ofintertrochanteric fracture in progress.This final report was 
electronically signed by Dr Aayush Mejia MD 20196:19 PMDictated By: 
Antony MEJIAte:  2019 18:19XR HIP COMP 2-3  [id number] 16:46:16To 
be done STAT in [facility] femur 2 - viewsHISTORY:  Postoperative evaluation of 
fracture repair.COMPARISON: 2019 at 8:15 AM.FINDINGS:Status post 
total right hip replacement with a prosthesis in adequate position.There is also
a fracture fixation with cerclage wires and curved metallic plateand screw 
constructalong the greater trochanter. Hardware appears in adequateposition. 
Postoperative soft tissue swelling and gas. Remote fractures of leftsuperior and
inferior rami.IMPRESSION:Status post total right hipreplacement with a 
prosthesis in adequate position.Status post ORIF with cerclage wires and curved 
metallic plate and screwconstruct along the greater trochanterThis final report 
was electronically signed by Dr Aayush Mejia MD 20194:39 PMDictated By: 
Edmar MEJIA:  2019 16:39URINALYSIS WITH [id number] 
10:23:00





             Test Item    Value        Reference Range Interpretation Comments

 

             Color (test code = UCOLR) Lt. Yellow                             

 

             Clarity (test code = UCLAR) Clear                                  

 

             Glucose (test code = UGLUC) NEGATIVE     NEGATIVE     N            

 

             Bilirubin (test code = UBILI) NEGATIVE     NEGATIVE     N          

  

 

             Ketones (test code = UKET) NEGATIVE     NEGATIVE     N            

 

             Specific Gravity (test code = 1.010        1.005-1.030  A          

  



             USPGR)                                              

 

             Blood (test code = UBLD) Trace-Intact NEGATIVE     A            

 

             PH (test code = UPH) 7.5          4.5-8.0      A            

 

             Protein (test code = UPROT) NEGATIVE     NEGATIVE     N            

 

             Urobilinogen (test code = U 0.2          >0.2         N            



             UROB)                                               

 

             Nitrite (test code = UNITR) Positive     NEGATIVE     A            

 

             Leukocyte Esterase (test code = Moderate     NEGATIVE     A        

    



             ULEUK)                                              

 

             WBC (test code = WBCUR) 30-40        0-5          A            

 

             RBC (test code = RBCUR) 0-5          0-5          N            

 

             Epithial Cells (test code = U 0-1          0-10         A          

  



             EPI)                                                

 

             Bacteria (test code = UBACT) 2+           None Seen,Trace A        

    



TROPONIN-I [id number] 09:16:00





             Test Item    Value        Reference Range Interpretation Comments

 

             Troponin-I (test <0.015 ng/ml 0.000-0.034  N            The 99th Pe

rcentile URL



             code = TROP)                                        is 0.045 ng/mL 

for the



                                                                 [facility] [place] T

roponin I.



                                                                  The Joint Euro

pean



                                                                 Society of



                                                                 Cardiology/Amer

[facility]



                                                                 College of Card

iology



                                                                 (ESC/ACC) and t

he



                                                                 National Academ

y of



                                                                 Clinical Bioche

radha



                                                                 Standards of [place]

boratory



                                                                 Practices (NACB

)



                                                                 recommends that

 the



                                                                 diagnosis of AM

I includes



                                                                 the presence of

 clinical



                                                                 history suggest

jean of



                                                                 Acute Coronary 

Syndrome



                                                                 (ACS) and a max

imum



                                                                 concentration o

f cardiac



                                                                 troponin exceed

ing the



                                                                 99th percentile

 of a



                                                                 normal referenc

e



                                                                 population [upp

er



                                                                 reference limit

 (URL)] on



                                                                 at least one oc

casion



                                                                 during the firs

t 24 hours



                                                                 after the clini

mitchell event.



                                                                 



PRO-BNP(B-Type Natriuretic Peptide)2019 09:16:00





             Test Item    Value        Reference Range Interpretation Comments

 

             Pro-BNP(B-Peptide) (test code = 141 pg/ml    0-450                 

    



             PROBNP)                                             



PT AND [id number] 09:15:00





             Test Item    Value        Reference Range Interpretation Comments

 

             Protime (test code 10.7 seconds 9.0-11.8                  



             = PT)                                               

 

             INR (test code = 1.0          0.9-1.1                   INR results

 are



             INR)                                                intended ONLY t

o



                                                                 monitor Oral



                                                                 Anticoagulant t

herapy



                                                                 in stablized pa

tients.



                                                                 The INR Therape

utic



                                                                 Range is 2.0 - 

3.0



                                                                 Patients with a



                                                                 mechanical hear

t, the



                                                                 INR Range is 2.

5 - 3.5



[id number] 09:15:00





             Test Item    Value        Reference Range Interpretation Comments

 

             aPTT (test code = PTT) 26.3 seconds 25.3-35.7                 



[id number] 09:14:00





             Test Item    Value        Reference Range Interpretation Comments

 

             Glucose (test code 115 mg/dl           H            



             = GLU)                                              

 

             BUN (test code = 15.0 mg/dl   6.0-17.0                  



             BUN)                                                

 

             Creatinine (test 0.7 mg/dl    0.4-1.2                   



             code = CREA)                                        

 

             Sodium (test code = 132 mmol/l   137-145      L            



             NA)                                                 

 

             Potassium (test 4.2 mmol/l   3.5-5.0                   



             code = K)                                           

 

             Chloride (test code 99 mmol/l                        



             = CL)                                               

 

             CO2 (test code = 28 mmol/l    22-30                     



             CO2)                                                

 

             Calcium (test code 8.4 mg/dl    8.4-10.2                  



             = CALC)                                             

 

             T Protein (test 8.2 gm/dl    5.1-8.7                   



             code = TP)                                          

 

             Albumin (test code 3.7 gm/dl    3.5-4.6                   



             = ALB)                                              

 

             A/G Ratio (test 0.8 %        1.1-2.2      L            



             code = AGRAT)                                        

 

             AST (SGOT) (test 36 U/L       11-36                     



             code = AST)                                         

 

             ALT (SGPT) (test 16 U/L       11-40                     



             code = ALT)                                         

 

             Alkaline Phos (test 51 U/L                           



             code = ALKP)                                        

 

             Total Bilirubin 0.5 mg/dl    0.2-1.2                   



             (test code = TBIL)                                        

 

             Globulin (test code 4.5 gm/dl    2.3-3.5      H            



             = GLOBU)                                            

 

             Calcium, Corrected 8.6 mg/dl    8.4-10.2                  Various f

ormulas exist



             (test code =                                        for corrected s

nette



             CALCCORR)                                           calcium results

, each



                                                                 yielding differ

ent



                                                                 values.  This



                                                                 corrected resul

t was



                                                                 based on the fo

rmula:



                                                                 Corrected Calci

um =



                                                                 SerumCalcium + 

[0.8 *



                                                                 ( 4 - SerumAlbu

min)]

 

             EGFR if  >60                                    



             American (test code mL/min/1.73m\                           



             = EGFRAA)    S\2                                    

 

             EGFR if Non- >60                                    Estimate

d Glomerular



             American (test code mL/min/1.73m\                           Filtrat

ion Rate (eGFR)



             = EGFRNA)    S\2                                    Reference Inter

vals



                                                                 Decision Points

 for 18



                                                                 years and older

 and



                                                                 average body ma

ss:



                                                                  >=  60



                                                                    Does not exc

lude



                                                                 kidney disease.

 30 -



                                                                 59



                                                                 Suggests modera

te



                                                                 chronic kidney 

disease



                                                                 and



                                                                         indicat

es the



                                                                 need for furthe

r



                                                                 investigation



                                                                 



                                                                 including asses

sment



                                                                 of proteinuria 

and



                                                                 



                                                                 



                                                                 cardiovascular



                                                                 factors. < 30



                                                                      Usually in

dicates



                                                                 a need for refe

rral



                                                                 for assessment



                                                                                

    and



                                                                 management of c

hronic



                                                                 kidney failure.



CBC WITH AUTO [id number] 09:05:00





             Test Item    Value        Reference Range Interpretation Comments

 

             WBC (test code = WBC) 12.90 10\S\3/ul 4.80-10.80   H            

 

             RBC (test code = RBC) 4.00 10\S\6/ul 4.20-5.40    L            

 

             Hemoglobin (test code = HGB) 12.2 gm/dl   12.0-14.0                

 

 

             Hematocrit (test code = HCT) 36.5 %       37.0-47.0    L           

 

 

             MCV (test code = MCV) 91.3 fL      81.0-99.0                 

 

             MCH (test code = MCH) 30.5 pg      27.0-31.0                 

 

             MCHC (test code = MCHC) 33.4 gm/dl   33.0-37.0                 

 

             RDW (test code = RDWVC) 13.2 %       11.5-14.5                 

 

             Platelet (test code = PLT) 309 10\S\3/ul 130-400                   

 

             MPV (test code = MPV) 8.5 fL       7.4-10.4     A            

 

             NE% (test code = NE) 82.9 %       42.0-75.0    H            

 

             LY% (test code = LY) 10.0 %       13.0-42.0    L            

 

             MO% (test code = MO) 6.2 %        4.0-14.0                  

 

             EO% (test code = EO) 0.2 %        1.0-5.0      L            

 

             BA% (test code = BA) 0.5 %        0.0-3.0                   

 

             IG% (test code = IG%) 0.2 %        0.0-0.4                   



XR HIP COMP 2-3  [id number] 08:46:11Right hip 2 - viewsHISTORY:  Pain and 
decreased range of motion status post fall.COMPARISON: NoneFINDINGS:There is 
intertrochanteric fracture of the right femur, with approximately 1.9cm superior
displacement of the distal fracture fragment with overriding.Degenerative 
changes of the right hip, lower lumbar spine and SI joints.Vascular 
calcificationsIMPRESSION:Displaced intertrochanteric fracture of the right 
femur.This final report was electronically signed by Dr Aayush Mejia MD 
20198:39 AMDictated By: AAYUSH MEJIADate:  2019 08:39US CAROTID 
BILAT [id number] 12:56:46Carotid Doppler ultrasound:History: Smita 
stenosis, history of left CEADuplex scanning, spectral analysis and real-time 
grayscale and color Dopplerimaging of the extracranial cerebrovascular system 
was performed.There is mixed plaque in the right common carotid artery, 
bifurcation andproximal ICA. Limited soft plaque is noted on the left. The 
spectral waveformsshow no significant abnormality on the left and spectral 
broadening in the ICAon the right.The peak systolic velocity in the right ICA is
127 cm/sec. The peakend-diastolic velocity is 20 cm/s.The peak systolic velocity
in the left ICA is 111cm/sec. The end-diastolicvelocity is 29 cm/s.The ICA/CCA 
ratio on the right is 2.5.The ICA/CCA ratioon the left is 1.8.Antegrade flow is 
noted in the vertebral arteries bilaterally.Impression:1. Doppler findings 
consistent with 50-69% stenosis in the proximal right ICA.2. No hemodynamically 
significant stenosis by Doppler on the left.3. Antegrade vertebral artery flow 
is present bilaterally.This final report was electronically signed by Dr Lokesh Esposito MD 201912:50 PMDictated By: LOKESH ESPOSITODate:  2019 12:50
XR FOOT COMP MIN 3 [id number] 17:02:13Right foot 3 viewsDATE OF EXAM: 
2019INDICATION: Right foot pain after recent fallDISCUSSION: AP,oblique and 
lateral views were obtained. No fracture ordislocation is identified. The joint 
spaces are preserved. Mild osteopenia isnoted. The soft tissues show no 
significant abnormality.IMPRESSION:Noacute bony or joint abnormality.This final 
report was electronically signed by Dr Lokesh Esposito MD20194:55 PMDictated
By: LOKESH ESPOSITODate:  2019 16:55XR ANKLE COMP MIN 3 [id number] 
16:44:46Right ankle 3 views:History: Pain and swelling, recent fallAP, oblique 
and lateral views were obtained. Diffuse soft tissue swelling ispresent, greater
medially and anteriorly. There is no fracture or dislocation.No joint effusion 
is noted. The ankle mortise is intact. Mild osteopenia isnoted.Impression: Soft 
tissue swelling with no acute bony or joint abnormalityidentified.This final 
report was electronically signed by Dr Lokesh Esposito MD 20194:38 
PMDictated By: LOKESH ESPOSITODate:  2019 16:38US CAROTID BILAT Doppler
2018 15:03:39Carotid Doppler ultrasound:History: Carotid stenosis, history
of left CEA in 2017Duplex scanning, spectral analysis and real-time grayscale 
and color Dopplerimaging of the extracranial cerebrovascular system was 
performed.There is irregular mixed plaque noted in the right carotid bulb and 
ICA. Changesof left carotid endarterectomy are present.The peak systolic 
velocity in the right ICA is 125 cm/sec.The peak systolic velocity in the left 
ICA is 135 cm/sec.The ICA/CCA ratio on the right is 1.8.The ICA/CCA ratio on the
left is 2.2.Antegrade flow is noted in the vertebral arteries 
bilaterally.Impression:1. Atherosclerotic changes of the right with Doppler 
findings consistent jjpq49-12% stenosis. Similar findings were noted on prior 
study of .2. Interval changes of left carotid endarterectomy 
with no findings by Dopplerto indicate restenosis.This final report was 
electronically signed by Dr Lokesh Esposito MD 20182:57 PMDictated By: 
LOKESH ESPOSITODate:  2018 15:03MM MAMMO SCRN 3D [id number] 16:13:23
Procedure:  MM MAMMO SCREEN DIR DIGITAL, MM MAMMO SCRN 3D TOMOExam Date:  
2017Ordering Provider:  GILDARDO Marleyinical Indication:  
ScreeningComparison:  Lesley 10, 2016 and 2011Findings: 3-D 
tomosynthesis views of both breasts were obtained. The study isinterpreted using
computer-aided detection (CAD).There are scattered fibroglandular densities 
bilaterally. No dominant mass orarchitectural distortion is identified. There 
are no suspicious calcifications.Impression:  No mammographic evidence of 
malignancy.BI-RADS 1: Negative.This final report was electronically signed by Dr Lokesh Esposito MD 20174:07 PMDictated By: LOKESH ESPOSITODate:  
2017 16:13MM MAMMO SCREEN DIR [id number] 16:13:20Procedure:  MM 
MAMMO SCREEN DIR DIGITAL, MM MAMMO SCRN 3D TOMOExam Date:  2017Ordering 
Provider:  GILDARDO Marleyinical Indication:  ScreeningComparison:  Lesley 10, 
2016 and 2011Findings: 3-D tomosynthesis views of both breasts were
obtained. The study isinterpreted using computer-aided detection (CAD).There are
scattered fibroglandular densities bilaterally. No dominant mass orarchitectural
distortion is identified. There are no suspicious calcifications.Impression:  No
mammographic evidence of malignancy.BI-RADS 1: Negative.This final report was 
electronically signed by Dr Lokesh Esposito MD 20174:07 PMDictated By: 
LOKESH ESPOSITODate:  2017 16:[id number] 12:18:00





             Test Item    Value        Reference Range Interpretation Comments

 

             Glucose (test code 88 mg/dl                         



             = GLU)                                              

 

             BUN (test code = 14.0 mg/dl   6.0-17.0                  



             BUN)                                                

 

             Creatinine (test 0.7 mg/dl    0.4-1.2                   



             code = CREA)                                        

 

             Sodium (test code = 135 mmol/l   137-145      L            



             NA)                                                 

 

             Potassium (test 4.6 mmol/l   3.5-5.0                   



             code = K)                                           

 

             Chloride (test code 97 mmol/l           L            



             = CL)                                               

 

             CO2 (test code = 27 mmol/l    22-30                     



             CO2)                                                

 

             Anion Gap (test 12                                     



             code = GAP)                                         

 

             Calcium (test code 9.3 mg/dl    8.4-10.2                  



             = CALC)                                             

 

             T Protein (test 7.8 gm/dl    5.1-8.7                   



             code = TP)                                          

 

             Albumin (test code 4.1 gm/dl    3.5-4.6                   



             = ALB)                                              

 

             A/G Ratio (test 1.1 %        1.1-2.2                   



             code = AGRAT)                                        

 

             AST (SGOT) (test 21 U/L       11-36                     



             code = AST)                                         

 

             ALT (SGPT) (test 19 U/L       11-40                     



             code = ALT)                                         

 

             Alkaline Phos (test 80 U/L                           



             code = ALKP)                                        

 

             Total Bilirubin 0.7 mg/dl    0.2-1.2                   



             (test code = TBIL)                                        

 

             Globulin (test code 3.7 gm/dl    2.3-3.5      H            



             = GLOBU)                                            

 

             Calcium, Corrected 9.2 mg/dl    8.4-10.2                  Various f

ormulas exist



             (test code =                                        for corrected s

nette



             CALCCORR)                                           calcium results

, each



                                                                 yielding differ

ent



                                                                 values.  This



                                                                 corrected resul

t was



                                                                 based on the fo

rmula:



                                                                 Corrected Calci

um =



                                                                 SerumCalcium + 

[0.8 *



                                                                 ( 4 - SerumAlbu

min)]

 

             EGFR if  >60                                    



             American (test code mL/min/1.73m\                           



             = EGFRAA)    S\2                                    

 

             EGFR if Non- >60                                    Estimate

d Glomerular



             American (test code mL/min/1.73m\                           Filtrat

ion Rate (eGFR)



             = EGFRNA)    S\2                                    Reference Inter

vals



                                                                 Decision Points

 for 18



                                                                 years and older

 and



                                                                 average body ma

ss:



                                                                  >=  60



                                                                    Does not exc

lude



                                                                 kidney disease.

 30 -



                                                                 59



                                                                 Suggests modera

te



                                                                 chronic kidney 

disease



                                                                 and



                                                                         indicat

es the



                                                                 need for furthe

r



                                                                 investigation



                                                                 



                                                                 including asses

sment



                                                                 of proteinuria 

and



                                                                 



                                                                 



                                                                 cardiovascular



                                                                 factors. < 30



                                                                      Usually in

dicates



                                                                 a need for refe

rral



                                                                 for assessment



                                                                                

    and



                                                                 management of c

hronic



                                                                 kidney failure.



[id number] 12:08:00





             Test Item    Value        Reference Range Interpretation Comments

 

             aPTT (test code = PTT) 25.4 seconds 25.3-35.7                 



PT AND [id number] 12:08:00





             Test Item    Value        Reference Range Interpretation Comments

 

             Protime (test code 10.0 seconds 9.0-11.8                  



             = PT)                                               

 

             INR (test code = 1.0          0.9-1.1                   INR results

 are



             INR)                                                intended ONLY t

o



                                                                 monitor Oral



                                                                 Anticoagulant t

herapy



                                                                 in stablized pa

tients.



                                                                 The INR Therape

utic



                                                                 Range is 2.0 - 

3.0



                                                                 Patients with a



                                                                 mechanical hear

t, the



                                                                 INR Range is 2.

5 - 3.5



CBC (HEMOGRAM ONLY)2017 12:03:00





             Test Item    Value        Reference Range Interpretation Comments

 

             WBC (test code = WBC) 10.9 k/ul    4.8-10.8     H            

 

             RBC (test code = RBC) 3.93 Millions/ul 4.20-5.40    L            

 

             Hemoglobin (test code = HGB) 11.9 gm/dl   12.0-14.0    L           

 

 

             Hematocrit (test code = HCT) 36.3 %       37.0-47.0    L           

 

 

             MCV (test code = MCV) 92.3 fL      81.0-99.0                 

 

             MCH (test code = MCH) 30.4 pg      27.0-31.0                 

 

             MCHC (test code = MCHC) 32.9 gm/dl   33.0-37.0    L            

 

             RDW (test code = RDWVC) 14.4 %       11.5-14.5                 

 

             Platelet (test code = PLT) 332 k/ul     130-400                   

 

             MPV (test code = MPV) 7.6 fL       7.4-10.4                  



[id number] 17:52:00





             Test Item    Value        Reference Range Interpretation Comments

 

             Glucose (test code 119 mg/dl           H            



             = GLU)                                              

 

             BUN (test code = 17.0 mg/dl   6.0-17.0                  



             BUN)                                                

 

             Creatinine (test 0.9 mg/dl    0.4-1.2                   



             code = CREA)                                        

 

             Sodium (test code = 134 mmol/l   137-145      L            



             NA)                                                 

 

             Potassium (test 4.0 mmol/l   3.5-5.0                   



             code = K)                                           

 

             Chloride (test code 94 mmol/l           L            



             = CL)                                               

 

             CO2 (test code = 28 mmol/l    22-30                     



             CO2)                                                

 

             Calcium (test code 9.1 mg/dl    8.4-10.2                  



             = CALC)                                             

 

             T Protein (test 8.0 gm/dl    5.1-8.7                   



             code = TP)                                          

 

             Albumin (test code 4.1 gm/dl    3.5-4.6                   



             = ALB)                                              

 

             A/G Ratio (test 1.1 %        1.1-2.2                   



             code = AGRAT)                                        

 

             AST (SGOT) (test 28 U/L       11-36                     



             code = AST)                                         

 

             ALT (SGPT) (test 25 U/L       11-40                     



             code = ALT)                                         

 

             Alkaline Phos (test 101 U/L                          



             code = ALKP)                                        

 

             Total Bilirubin 0.3 mg/dl    0.2-1.2                   



             (test code = TBIL)                                        

 

             Globulin (test code 3.9 gm/dl    2.3-3.5      H            



             = GLOBU)                                            

 

             Anion Gap (test 11                                     



             code = GAP)                                         

 

             Calcium, Corrected 9.0 mg/dl    8.4-10.2                  Various f

ormulas exist



             (test code =                                        for corrected s

nette



             CALCCORR)                                           calcium results

, each



                                                                 yielding differ

ent



                                                                 values.  This



                                                                 corrected resul

t was



                                                                 based on the fo

rmula:



                                                                 Corrected Calci

um =



                                                                 SerumCalcium + 

[0.8 *



                                                                 ( 4 - SerumAlbu

min)]

 

             EGFR if  >60                                    



             American (test code mL/min/1.73m\                           



             = EGFRAA)    S\2                                    

 

             EGFR if Non- >60                                    Estimate

d Glomerular



             American (test code mL/min/1.73m\                           Filtrat

ion Rate (eGFR)



             = EGFRNA)    S\2                                    Reference Inter

vals



                                                                 Decision Points

 for 18



                                                                 years and older

 and



                                                                 average body ma

ss:



                                                                  >=  60



                                                                    Does not exc

lude



                                                                 kidney disease.

 30 -



                                                                 59



                                                                 Suggests modera

te



                                                                 chronic kidney 

disease



                                                                 and



                                                                         indicat

es the



                                                                 need for furthe

r



                                                                 investigation



                                                                 



                                                                 including asses

sment



                                                                 of proteinuria 

and



                                                                 



                                                                 



                                                                 cardiovascular



                                                                 factors. < 30



                                                                      Usually in

dicates



                                                                 a need for refe

rral



                                                                 for assessment



                                                                                

    and



                                                                 management of c

hronic



                                                                 kidney failure.



PT AND [id number] 17:36:00





             Test Item    Value        Reference Range Interpretation Comments

 

             Protime (test code 10.0 seconds 9.0-11.8                  



             = PT)                                               

 

             INR (test code = 1.0          0.9-1.1                   INR results

 are



             INR)                                                intended ONLY t

o



                                                                 monitor Oral



                                                                 Anticoagulant t

herapy



                                                                 in stablized pa

tients.



                                                                 The INR Therape

utic



                                                                 Range is 2.0 - 

3.0



                                                                 Patients with a



                                                                 mechanical hear

t, the



                                                                 INR Range is 2.

5 - 3.5



[id number] 17:36:00





             Test Item    Value        Reference Range Interpretation Comments

 

             aPTT (test code = PTT) 26.6 seconds 25.3-35.7                 



URINALYSIS WITH [id number] 17:09:00





             Test Item    Value        Reference Range Interpretation Comments

 

             Color (test code = UCOLR) Lt. Yellow                             

 

             Clarity (test code = UCLAR) SL CLOUDY                              

 

             Glucose (test code = UGLUC) NEGATIVE     NEGATIVE     N            

 

             Bilirubin (test code = UBILI) NEGATIVE     NEGATIVE     N          

  

 

             Ketones (test code = UKET) TRACE        NEGATIVE     A            

 

             Specific Gravity (test code = 1.010        1.005-1.030  A          

  



             USPGR)                                              

 

             Blood (test code = UBLD) MODERATE     NEGATIVE     A            

 

             PH (test code = UPH) 6.5          4.5-8.0      A            

 

             Protein (test code = UPROT) NEGATIVE     NEGATIVE     N            

 

             Urobilinogen (test code = U UROB) 0.2          >0.2         N      

      

 

             Nitrite (test code = UNITR) NEGATIVE     NEGATIVE     N            

 

             Leukocyte Esterase (test code = LARGE        NEGATIVE     A        

    



             ULEUK)                                              

 

             WBC (test code = WBCUR) 10-20        0-5          A            

 

             RBC (test code = RBCUR) 3-5          0-5          A            

 

             Epithial Cells (test code = U EPI) TNTC         0-10         A     

       

 

             Mucous (test code = UMUC) None Seen    None Seen    N            

 

             Bacteria (test code = UBACT) 1+           None Seen,Trace A        

    



CBC (HEMOGRAM ONLY)2017 16:56:00





             Test Item    Value        Reference Range Interpretation Comments

 

             WBC (test code = WBC) 8.2 k/ul     4.8-10.8                  

 

             RBC (test code = RBC) 4.29 Millions/ul 4.20-5.40                 

 

             Hemoglobin (test code = HGB) 13.2 gm/dl   12.0-14.0                

 

 

             Hematocrit (test code = HCT) 39.6 %       37.0-47.0                

 

 

             MCV (test code = MCV) 92.3 fL      81.0-99.0                 

 

             MCH (test code = MCH) 30.8 pg      27.0-31.0                 

 

             MCHC (test code = MCHC) 33.4 gm/dl   33.0-37.0                 

 

             RDW (test code = RDWVC) 14.3 %       11.5-14.5                 

 

             Platelet (test code = PLT) 427 k/ul     130-400      H            

 

             MPV (test code = MPV) 7.5 fL       7.4-10.4

## 2022-02-16 ENCOUNTER — HOSPITAL ENCOUNTER (EMERGENCY)
Dept: HOSPITAL 97 - ER | Age: 87
Discharge: HOME | End: 2022-02-16
Payer: COMMERCIAL

## 2022-02-16 VITALS — DIASTOLIC BLOOD PRESSURE: 69 MMHG | OXYGEN SATURATION: 97 % | SYSTOLIC BLOOD PRESSURE: 134 MMHG

## 2022-02-16 VITALS — TEMPERATURE: 98.3 F

## 2022-02-16 DIAGNOSIS — E78.5: ICD-10-CM
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
## 2022-02-16 DIAGNOSIS — M54.50: Primary | ICD-10-CM

## 2022-02-16 LAB
ALBUMIN SERPL BCP-MCNC: 3.7 G/DL (ref 3.4–5)
ALP SERPL-CCNC: 57 U/L (ref 45–117)
ALT SERPL W P-5'-P-CCNC: 18 U/L (ref 12–78)
AST SERPL W P-5'-P-CCNC: 19 U/L (ref 15–37)
BUN BLD-MCNC: 10 MG/DL (ref 7–18)
GLUCOSE SERPLBLD-MCNC: 114 MG/DL (ref 74–106)
HCT VFR BLD CALC: 41.1 % (ref 36–45)
INR BLD: 1.01
LYMPHOCYTES # SPEC AUTO: 1.7 K/UL (ref 0.7–4.9)
MAGNESIUM SERPL-MCNC: 2.4 MG/DL (ref 1.8–2.4)
NT-PROBNP SERPL-MCNC: 1000 PG/ML (ref ?–450)
PMV BLD: 7 FL (ref 7.6–11.3)
POTASSIUM SERPL-SCNC: 3.4 MMOL/L (ref 3.5–5.1)
RBC # BLD: 4.6 M/UL (ref 3.86–4.86)
TROPONIN I SERPL HS-MCNC: 32.8 PG/ML (ref ?–58.9)

## 2022-02-16 PROCEDURE — 85025 COMPLETE CBC W/AUTO DIFF WBC: CPT

## 2022-02-16 PROCEDURE — 93005 ELECTROCARDIOGRAM TRACING: CPT

## 2022-02-16 PROCEDURE — 71250 CT THORAX DX C-: CPT

## 2022-02-16 PROCEDURE — 72125 CT NECK SPINE W/O DYE: CPT

## 2022-02-16 PROCEDURE — 85610 PROTHROMBIN TIME: CPT

## 2022-02-16 PROCEDURE — 83735 ASSAY OF MAGNESIUM: CPT

## 2022-02-16 PROCEDURE — 84484 ASSAY OF TROPONIN QUANT: CPT

## 2022-02-16 PROCEDURE — 80076 HEPATIC FUNCTION PANEL: CPT

## 2022-02-16 PROCEDURE — 36415 COLL VENOUS BLD VENIPUNCTURE: CPT

## 2022-02-16 PROCEDURE — 80048 BASIC METABOLIC PNL TOTAL CA: CPT

## 2022-02-16 PROCEDURE — 83880 ASSAY OF NATRIURETIC PEPTIDE: CPT

## 2022-02-16 PROCEDURE — 70450 CT HEAD/BRAIN W/O DYE: CPT

## 2022-02-16 NOTE — ER
Nurse's Notes                                                                                     

 [facility]                                                                 

Name: Sharonda Gutierrez                                                                              

Age: 89 yrs                                                                                       

Sex: Female                                                                                       

: 1932                                                                                   

MRN: [id number]                                                                                   

Arrival Date: 2022                                                                          

Time: 06:31                                                                                       

Account#: [id number]                                                                            

Bed 6                                                                                             

Private MD:                                                                                       

Diagnosis: Fall from bed, initial encounter;Low back pain                                         

                                                                                                  

Presentation:                                                                                     

                                                                                             

06:33 Chief complaint: EMS states: at some point in the night pt rolled off bed onto floor.   as6 

      pt did hit head, denies LOC. Care prior to arrival: None. Mechanism of Injury: Fall         

      from standing position. Trauma event details: Injury occurred in the [facility].    

06:33 Acuity: ALLAN 3                                                                           as6 

06:33 Method Of Arrival: EMS: [facility]                                                as6 

06:36 Coronavirus screen: At this time, the client does not indicate any symptoms associated  as6 

      with coronavirus-19. Ebola Screen: No symptoms or risks identified at this time.            

      Initial Sepsis Screen: Does the patient meet any 2 criteria? No. Patient's initial          

      sepsis screen is negative. Does the patient have a suspected source of infection? No.       

      Patient's initial sepsis screen is negative. Risk Assessment: Do you want to hurt           

      yourself or someone else? Patient reports no desire to harm self or others. Onset of        

      symptoms was 2022.                                                             

                                                                                                  

Trauma Activation: Not Applicable                                                                 

 Physician: ED Physician; Name: ; Notified At: ; Arrived At:                                      

 Physician: General Surgeon; Name: ; Notified At: ; Arrived At:                                   

 Physician: Radiology; Name: ; Notified At: ; Arrived At:                                         

 Physician: Respiratory; Name: ; Notified At: ; Arrived At:                                       

 Physician: Lab; Name: ; Notified At: ; Arrived At:                                               

                                                                                                  

Historical:                                                                                       

- Allergies:                                                                                      

06:38 No Known Allergies;                                                                     as6 

- PMHx:                                                                                           

06:38 Hyperlipidemia;                                                                         as6 

- PSHx:                                                                                           

06:38 hip;                                                                                    as6 

                                                                                                  

- Immunization history: Last tetanus immunization: unknown.                                       

- Social history:: Smoking status: Patient denies any tobacco usage or history of.                

                                                                                                  

                                                                                                  

Screenin:39 Abuse screen: Denies threats or abuse. Denies injuries from another. Nutritional        as6 

      screening: No deficits noted. Tuberculosis screening: No symptoms or risk factors           

      identified. Fall Risk Fall in past 12 months (25 points). Total Harden Fall Scale            

      indicates Low Risk Score (25-44 pts). Fall prevention measures have been instituted.        

      Frequent Obs/Assesments occuring.                                                           

                                                                                                  

Primary Survey:                                                                                   

06:40 NO uncontrolled hemorrhage observed. A: The patient is alert. Airway: patent.           as6 

      Breathing/Chest: Respiratory pattern: regular, Respiratory effort: spontaneous.             

      Circulation: Pulses: palpable . Disability Alert. Exposure/Environment: A warming           

      method has been applied: A warm blanket has been provided to the patient.                   

06:45 Reassessment Airway Airway Patent Breathing/Chest Respiratory pattern Regular           as6 

      Respiratory effort Spontaneous Circulation Pulses Palpable Disability Alert.                

                                                                                                  

Assessment:                                                                                       

06:36 General: Appears in no apparent distress. Behavior is calm, cooperative. Pain: Denies   as6 

      pain.                                                                                       

06:44 Neuro: Level of Consciousness is awake, alert, obeys commands, Oriented to person,      as6 

      place, time, situation.                                                                     

08:00 Pain: Denies pain. Respiratory: No deficits noted. Derm: Bruising that is on right arm  ke1 

      on failed IV insertion site. Injury Description: No sign of injury post fall.               

09:02 Reassessment: No changes from previously documented assessment.                         ke1 

10:19 Reassessment: Patient appears in no apparent distress at this time. No changes from     ww  

      previously documented assessment. Patient and/or family updated on plan of care and         

      expected duration. Pain level reassessed. Patient is alert, oriented x 3, equal             

      unlabored respirations, skin warm/dry/pink. patient and son instructed on discharge         

      instructions and follow up.                                                                 

                                                                                                  

Vital Signs:                                                                                      

06:36  / 66; Pulse 74; Resp 18 S; Temp 98.3(O); Pulse Ox 99% on R/A; Weight 61.23 kg    as6 

      (R); Height 5 ft. 6 in. (167.64 cm) (R); Pain 0/10;                                         

06:36  / 66; Pulse 78; Resp 18; Temp 98.3(O); Pulse Ox 99% on R/A; Weight 61.23 kg;     oe  

      Height 5 ft. 6 in. (167.64 cm);                                                             

08:34  / 108; Pulse 65; Resp 18; Pulse Ox 98% on R/A;                                   ke1 

10:04  / 69; Pulse 75; Resp 18; Pulse Ox 97% on R/A;                                    ab2 

06:36 Body Mass Index 21.79 (61.23 kg, 167.64 cm)                                             as6 

                                                                                                  

Dev Coma Score:                                                                               

06:40 Eye Response: spontaneous(4). Verbal Response: oriented(5). Motor Response: obeys       as6 

      commands(6). Total: 15.                                                                     

08:34 Eye Response: spontaneous(4). Verbal Response: oriented(5). Motor Response: obeys       ke1 

      commands(6). Total: 15.                                                                     

                                                                                                  

Trauma Score (Adult):                                                                             

06:40 Eye Response: spontaneous(1); Verbal Response: oriented(1); Motor Response: obeys       as6 

      commands(2); Systolic BP: > 89 mm Hg(4); Respiratory Rate: 10 to 29 per min(4); Dev     

      Score: 15; Trauma Score: 12                                                                 

08:34 Eye Response: spontaneous(1); Verbal Response: oriented(1); Motor Response: obeys       ke1 

      commands(2); Systolic BP: > 89 mm Hg(4); Respiratory Rate: 10 to 29 per min(4); Dev     

      Score: 15; Trauma Score: 12                                                                 

                                                                                                  

ED Course:                                                                                        

06:31 Patient arrived in ED.                                                                  wm  

06:33 Robbin Dawkins, RN is Primary Nurse.                                                    as6 

06:36 Triage completed.                                                                       as6 

06:38 Jamar Dubon PA is PHCP.                                                                cp  

06:38 Jamar Rodriguez MD is Attending Physician.                                             cp  

06:39 Arm band placed on left wrist.                                                          as6 

06:40 Bed in low position. Call light in reach. Side rails up X2. Pulse ox on. NIBP on. Warm  as6 

      blanket given. Pillow given.                                                                

06:41 Patient maintains SpO2 saturation greater than 95% on room air. Thermoregulation: warm  as6 

      blanket given to patient.                                                                   

07:02 Missed attempt(s): 20 gauge 22 gauge in left in right forearm. Bleeding controlled,     as6 

      band aid applied, catheter tip intact.                                                      

07:28 CT Traumagram (Head C Spine CAP wo con) In Process Unspecified.                         EDMS

07:30 Inserted saline lock: 20 gauge in right antecubital area, using aseptic technique.      ke1 

08:07 CK Sent.                                                                                ww  

10:20 No provider procedures requiring assistance completed. intact, bleeding controlled, No  ww  

      redness/swelling at site. Pressure dressing applied.                                        

                                                                                                  

Administered Medications:                                                                         

10:04 Drug: Ketorolac 15 mg Route: IVP; Site: right antecubital;                              ab2 

10:04 Drug: Tylenol 1000 mg Route: PO;                                                        ab2 

                                                                                                  

                                                                                                  

Intake:                                                                                           

10:19 PO: 0ml; Total: 0ml.                                                                    ww  

                                                                                                  

Output:                                                                                           

10:19 Urine: 1ml (Voided); Total: 1ml.                                                        ww  

                                                                                                  

Outcome:                                                                                          

09:07 Discharge ordered by MD.                                                                cp  

10:19 Discharged to home ambulatory, with family.                                             ww  

10:19 Condition: stable                                                                           

10:19 Patient's length of stay in the Emergency Department was greater than 2 hours.              

10:20 Discharge instructions given to patient, family, Instructed on discharge instructions,  ww  

      follow up and referral plans. medication usage, safety practices, Demonstrated              

      understanding of instructions, follow-up care, medications.                                 

10:21 Patient left the ED.                                                                    ww  

                                                                                                  

Signatures:                                                                                       

Dispatcher [employer]                           EDMS                                                 

Jamar Dubon PA PA cp Espinosa, Orlando oe Marsh, Wendy wm Slawson, Ashby, RN                      RN   as6                                                  

Johanna Smith RN                       RN   ww                                                   

Hugo Vila                           ab2                                                  

Earl Knight RN                   RN   ke1                                                  

                                                                                                  

Corrections: (The following items were deleted from the chart)                                    

06:39 06:38 PMHx: Thyroid problem; as6                                                        as6 

06:44 06:36 Pain: Complains of pain in abdomen as6                                            as6 

                                                                                                  

**************************************************************************************************

## 2022-02-16 NOTE — EKG
Test Date:    2022-02-16               Test Time:    06:55:26

Technician:   JAK                                     

                                                     

MEASUREMENT RESULTS:                                       

Intervals:                                           

Rate:         75                                     

WI:           194                                    

QRSD:         96                                     

QT:           410                                    

QTc:          457                                    

Axis:                                                

P:            75                                     

WI:           194                                    

QRS:          8                                      

T:            -3                                     

                                                     

INTERPRETIVE STATEMENTS:                                       

                                                     

Sinus rhythm with occasional premature ventricular complexes

T wave abnormality, consider inferior ischemia

Abnormal ECG

No previous ECG available for comparison



Electronically Signed On 02-16-22 11:13:33 CST by Shyam Cornell

## 2022-02-16 NOTE — RAD REPORT
EXAM DESCRIPTION:

CT - Head C Spine Cap Wo Con - 2/16/2022 7:28 am

 

TECHNIQUE:  Computed axial tomography of the head and cervical spine was obtained. Coronal and sagitt
al reconstruction was performed

 

Computed axial tomography of the chest, abdomen and pelvis was obtained. Contrast was not requested.

 

All CT scans are performed using dose optimization technique as appropriate and may include automated
 exposure control or mA/KV adjustment according to patient size.

 

CLINICAL HISTORY:

Head and neck injury with chest and abdominal pain status post fall out of bed

 

COMPARISON:  None

 

FINDINGS:

An intracranial bleed is not seen.

 

The ventricles are normal in caliber. An extra-axial fluid collection is not noted. .

 

Fluid within the sinuses/mastoids is not seen.

 

A cervical fracture is not seen. No dislocation is noted.

 

The evaluation of mediastinum, megan, vessels, solid organs and bowel are limited secondary to the lac
k of contrast administration.

 

A mediastinal hematoma is not noted. A pleural effusion is not seen. A lung contusion is not present.


 

The liver,spleen, pancreas, adrenals,kidneys and bladder do not demonstrate a traumatic injury.

 

Old vertebral fractures. Old pelvic fractures. Right hip arthroplasty.

 

Right inguinal hernia contains bowel.

 

IMPRESSION:

1. No acute intracranial abnormality is seen.

 

2. A cervical fracture is not visualized. If the patient continues have symptoms to suggest intracran
ial/spinal cord pathology MRI be recommended

 

3. No acute traumatic abnormality involving the chest/abdomen/pelvis.

## 2022-02-16 NOTE — EDPHYS
Physician Documentation                                                                           

 [facility]                                                                 

Name: Sharonda Gutierrez                                                                              

Age: 89 yrs                                                                                       

Sex: Female                                                                                       

: 1932                                                                                   

MRN: [id number]                                                                                   

Arrival Date: 2022                                                                          

Time: 06:31                                                                                       

Account#: [id number]                                                                            

Bed 6                                                                                             

Private MD:                                                                                       

ED Physician Jamar Rodriguez                                                                      

HPI:                                                                                              

                                                                                             

06:45 This 89 yrs old Female presents to ER via EMS with complaints of Fall Injury.           cp  

06:45 Details of fall: The patient fell from a height, bed. Onset: The symptoms/episode       cp  

      began/occurred last night.                                                                  

                                                                                                  

Historical:                                                                                       

- Allergies:                                                                                      

06:38 No Known Allergies;                                                                     as6 

- PMHx:                                                                                           

06:38 Hyperlipidemia;                                                                         as6 

- PSHx:                                                                                           

06:38 hip;                                                                                    as6 

                                                                                                  

- Immunization history: Last tetanus immunization: unknown.                                       

- Social history:: Smoking status: Patient denies any tobacco usage or history of.                

                                                                                                  

                                                                                                  

ROS:                                                                                              

06:50 Constitutional: Negative for body aches, chills, fever, poor PO intake.                 cp  

06:50 Eyes: Negative for injury, pain, redness, and discharge.                                cp  

06:50 Cardiovascular: Negative for chest pain, edema, palpitations.                               

06:50 Respiratory: Negative for cough, shortness of breath, wheezing.                             

06:50 Back: Positive for pain at rest, pain with movement, of the low back area and mid back      

      area.                                                                                       

                                                                                                  

Exam:                                                                                             

06:55 Constitutional: The patient appears in no acute distress, alert, awake, comfortable,    cp  

      non-diaphoretic, non-toxic, well developed, well nourished.                                 

06:55 Head/Face:  Normocephalic, atraumatic.                                                  cp  

06:55 Eyes: Periorbital structures: appear normal, Conjunctiva: normal, no exudate, no            

      injection, Sclera: no appreciated abnormality, Lids and lashes: appear normal,              

      bilaterally.                                                                                

06:55 ENT: External ear(s): are unremarkable, Nose: is normal, Mouth: Lips: moist, Oral           

      mucosa: moist, Posterior pharynx: Airway: no evidence of obstruction, patent.               

06:55 Neck: C-spine: vertebral tenderness, is not appreciated, crepitus, is not appreciated,      

      ROM/movement: is normal, is supple, without pain, no range of motions limitations.          

06:55 Chest/axilla: Inspection: normal.                                                           

06:55 Cardiovascular: Rate: normal, Rhythm: regular, Edema: is not appreciated, JVD: is not       

      appreciated.                                                                                

06:55 Respiratory: the patient does not display signs of respiratory distress,  Respirations:     

      normal, no use of accessory muscles, no retractions, labored breathing, is not present,     

      Breath sounds: are clear throughout, no decreased breath sounds, no stridor, no             

      wheezing.                                                                                   

06:55 Abdomen/GI: Inspection: abdomen appears normal, Bowel sounds: active, all quadrants,        

      Palpation: abdomen is soft and non-tender, in all quadrants.                                

06:55 Back: pain, that is mild, of the  low back area, ROM is normal.                             

06:55 Musculoskeletal/extremity: Extremities: all appear grossly normal, with no appreciated      

      pain with palpation.                                                                        

06:55 Neuro: Orientation: to person, place \T\ time. Mentation: is normal, Motor: moves all       

      fours, strength is normal, Sensation: is normal.                                            

07:04 ECG was reviewed by the Attending Physician.                                            cp  

                                                                                                  

Vital Signs:                                                                                      

06:36  / 66; Pulse 74; Resp 18 S; Temp 98.3(O); Pulse Ox 99% on R/A; Weight 61.23 kg    as6 

      (R); Height 5 ft. 6 in. (167.64 cm) (R); Pain 0/10;                                         

06:36  / 66; Pulse 78; Resp 18; Temp 98.3(O); Pulse Ox 99% on R/A; Weight 61.23 kg;     oe  

      Height 5 ft. 6 in. (167.64 cm);                                                             

08:34  / 108; Pulse 65; Resp 18; Pulse Ox 98% on R/A;                                   ke1 

10:04  / 69; Pulse 75; Resp 18; Pulse Ox 97% on R/A;                                    ab2 

06:36 Body Mass Index 21.79 (61.23 kg, 167.64 cm)                                             as6 

                                                                                                  

Dev Coma Score:                                                                               

06:40 Eye Response: spontaneous(4). Verbal Response: oriented(5). Motor Response: obeys       as6 

      commands(6). Total: 15.                                                                     

08:34 Eye Response: spontaneous(4). Verbal Response: oriented(5). Motor Response: obeys       ke1 

      commands(6). Total: 15.                                                                     

                                                                                                  

Trauma Score (Adult):                                                                             

06:40 Eye Response: spontaneous(1); Verbal Response: oriented(1); Motor Response: obeys       as6 

      commands(2); Systolic BP: > 89 mm Hg(4); Respiratory Rate: 10 to 29 per min(4); [place]     

      Score: 15; Trauma Score: 12                                                                 

08:34 Eye Response: spontaneous(1); Verbal Response: oriented(1); Motor Response: obeys       ke1 

      commands(2); Systolic BP: > 89 mm Hg(4); Respiratory Rate: 10 to 29 per min(4); Dev     

      Score: 15; Trauma Score: 12                                                                 

                                                                                                  

MDM:                                                                                              

06:42 Patient medically screened.                                                               

09:07 Data reviewed: vital signs, nurses notes, lab test result(s), EKG, radiologic studies,  cp  

      CT scan.                                                                                    

09:07 Test interpretation: by ED physician or midlevel provider: ECG. Counseling: I had a     cp  

      detailed discussion with the patient and/or guardian regarding: the historical points,      

      exam findings, and any diagnostic results supporting the discharge/admit diagnosis, lab     

      results, radiology results, to return to the emergency department if symptoms worsen or     

      persist or if there are any questions or concerns that arise at home.                       

                                                                                                  

                                                                                             

06:39 Order name: Basic Metabolic Panel; Complete Time: 08:40                                 cp  

                                                                                             

08:40 Interpretation: Normal except: ; K 3.4; GLUC 114; GFR 83.                         cp  

                                                                                             

06:39 Order name: CBC with Diff; Complete Time: 07:53                                         cp  

                                                                                             

07:53 Interpretation: Normal except: MPV 7.0.                                                 cp  

                                                                                             

06:39 Order name: LFT's; Complete Time: 08:40                                                 cp  

                                                                                             

06:39 Order name: Magnesium; Complete Time: 08:40                                             cp  

                                                                                             

06:39 Order name: NT PRO-BNP; Complete Time: 08:40                                            cp  

                                                                                             

06:39 Order name: PT-INR; Complete Time: 07:53                                                cp  

                                                                                             

06:39 Order name: Troponin HS; Complete Time: 08:40                                           cp  

                                                                                             

06:39 Order name: EKG; Complete Time: 06:40                                                   cp  

                                                                                             

06:39 Order name: Cardiac monitoring; Complete Time: 06:57                                    cp  

                                                                                             

06:39 Order name: CT Traumagram (Head C Spine CAP wo con); Complete Time: 08:15               cp  

                                                                                             

08:40 Interpretation: Report reviewed.                                                        cp  

02/16                                                                                             

06:39 Order name: EKG - Nurse/Tech; Complete Time: 06:57                                      cp  

                                                                                             

06:39 Order name: IV Saline Lock; Complete Time: 07:29                                        cp  

                                                                                             

06:39 Order name: Labs collected and sent; Complete Time: 07:56                               cp  

                                                                                             

06:39 Order name: O2 Per Protocol; Complete Time: 06:45                                       cp  

                                                                                             

06:39 Order name: O2 Sat Monitoring; Complete Time: 06:45                                     cp  

                                                                                                  

EC:04 Rate is 75 beats/min. Rhythm is regular. CT interval is normal. QRS interval is normal. cp  

      QT interval is normal. T waves are Inverted in leads III, V4. Interpreted by me.            

      Reviewed by me.                                                                             

                                                                                                  

Administered Medications:                                                                         

10:04 Drug: Ketorolac 15 mg Route: IVP; Site: right antecubital;                              ab2 

10:04 Drug: Tylenol 1000 mg Route: PO;                                                        ab2 

                                                                                                  

                                                                                                  

Disposition Summary:                                                                              

22 09:07                                                                                    

Discharge Ordered                                                                                 

      Location: Home                                                                          cp  

      Problem: new                                                                            cp  

      Symptoms: have improved                                                                 cp  

      Condition: Stable                                                                       cp  

      Diagnosis                                                                                   

        - Fall from bed, initial encounter                                                    cp  

        - Low back pain                                                                       cp  

      Followup:                                                                               cp  

        - With: Private Physician                                                                  

        - When: 2 - 3 days                                                                         

        - Reason: Worsening of condition                                                           

      Discharge Instructions:                                                                     

        - Discharge Summary Sheet                                                             cp  

        - Acute Back Pain, Adult                                                              cp  

        - Fall Prevention in the Home, Adult                                                  cp  

        - Heat Therapy                                                                        cp  

        - Back Exercises                                                                      cp  

      Forms:                                                                                      

        - Medication Reconciliation Form                                                      cp  

        - Thank You Letter                                                                    cp  

        - Antibiotic Education                                                                cp  

        - Prescription Opioid Use                                                             cp  

      Prescriptions:                                                                              

        - Lidoderm 5 % Topical adhesive patch,medicated                                            

            - apply 1 patch by TOPICAL route once daily; 1 box; Refills: 0, Product Selection cp  

      Permitted                                                                                   

        - Mobic 7.5 mg Oral Tablet                                                                 

            - take 1 tablet by ORAL route once daily take with food; 20 tablet; Refills: 0,   cp  

      Product Selection Permitted                                                                 

Signatures:                                                                                       

Dispatcher [employer]                           EDMS                                                 

Jamar Dubon PA PA   cp                                                   

Robbin Dawkins RN                      RN   as6                                                  

Hugo Vila                           ab2                                                  

                                                                                                  

Corrections: (The following items were deleted from the chart)                                    

06:39 06:38 PMHx: Thyroid problem; as6                                                        as6 

09:04 07:03 Creatine Phosphokinase ordered. EDMS                                              EDMS

                                                                                                  

**************************************************************************************************

## 2022-02-16 NOTE — XMS REPORT
Continuity of Care Document

                          Created on:2022



Patient:HENRIETTA MATIAS

Sex:Female

:1932

External Reference #:[id number]





Demographics







                          Address                   [address] [address]

 

                          Home Phone                [phone]

 

                          Work Phone                [phone]

 

                          Mobile Phone              [phone]

 

                          Email Address             [email or web address]

 

                          Preferred Language        English

 

                          Marital Status            Unknown

 

                          Faith Affiliation     Unknown

 

                          Race                      Unknown

 

                          Additional Race(s)        Unavailable



                                                    White

 

                          Ethnic Group              Not  or 









Author







                          Organization              [facility]

t

 

                          Address                   [address]



                                                    [place] [address]

 

                          Phone                     [phone]









Support







                Name            Relationship    Address         Phone

 

                BRENDA           Unavailable     [address] [phone]



                                                [place] [address] 

 

                BRENDA           Unavailable     [address] [phone]



                                                [place] [address] 

 

                Contact         Unavailable     [address] [place] [phone]



                                                [place] [address] 

 

                Brenda           Child           Unavailable     [phone]









Care Team Providers







                    Name                Role                Phone

 

                    PCP,  DOES NOT HAVE A Primary Care Physician Unavailable

 

                    Nurse,  Pob Immunization Attending Clinician Unavailable

 

                    Marcin Martinez DO   Attending Clinician [phone]

 

                    MARCIN MARTINEZ      Attending Clinician Unavailable

 

                    JOY                 Attending Clinician Unavailable

 

                    VICTOR HUGO ROY       Attending Clinician Unavailable

 

                    Joy NOVAK              Attending Clinician [phone]

 

                    CARLOS LEROY      Attending Clinician Unavailable

 

                    JAUN BOB        Attending Clinician Unavailable

 

                    FRANCI WYATT            Attending Clinician Unavailable

 

                    SEES                Attending Clinician Unavailable

 

                    DEVAN GERONIMO           Attending Clinician Unavailable

 

                    ROSAS SHULTZ          Attending Clinician Unavailable

 

                    TRACY                Attending Clinician Unavailable

 

                    CARLOS LEROY      Admitting Clinician Unavailable

 

                    FRANCI WYATT            Admitting Clinician Unavailable

 

                    SEES                Admitting Clinician Unavailable

 

                    DEVAN              Admitting Clinician Unavailable

 

                    DINORAH               Admitting Clinician Unavailable

 

                    ROSAS SHULTZ          Admitting Clinician Unavailable

 

                    TRACY                Admitting Clinician Unavailable









Payers







           Payer Name Policy Type Policy Number Effective Date Expiration Date ROSAS martinez

 

           MEDICARE PART A            [id number] [id number] 



           \T\ B                            00:00:00   00:00:00   

 

           [facility]            [id number]  2019-12-31 



           GENERIC                          00:00:00   00:00:00   







Problems







       Condition Condition Condition Status Onset  Resolution Last   Treating Co

mments 

Source



       Name   Details Category        Date   Date   Treatment Clinician        



                                                 Date                 

 

       Hypothyroi Hypothyroi Problem Active                                    [facility]

[facility]



       dism   dism                                                    [facility]



                                                                      [facility]



                                                                      (LUF/LI



                                                                      V/SA)

 

       L3     L3     Problem Active                                    [facility]



       FRACTURE FRACTURE                                                  [facility]



                                                                      [facility]



                                                                      (LUF/LI



                                                                      V/SA)

 

       Hyperchole Hyperchole Problem Active                                    [facility]

[facility]



       sterolemia sterolemia                                                  [facility]



                                                                      [facility]



                                                                      (LUF/LI



                                                                      V/SA)

 

       Backache Backache Problem Active                                    [facility]



                                                                      [facility]



                                                                      [facility]



                                                                      (LUF/LI



                                                                      V/SA)

 

       Arthritis Arthritis Problem Active                                    [facility]



                                                                      [facility]



                                                                      (LUF/LI



                                                                      V/SA)

 

       Vertigo Vertigo Problem Active                                    [facility]



                                                                      [facility]



                                                                      (LUF/LI



                                                                      V/SA)







Allergies, Adverse Reactions, Alerts







       Allergy Allergy Status Severity Reaction(s) Onset  Inactive Treating Comm

ents 

Source



       Name   Type                        Date   Date   Clinician        

 

       No Known DA     Active U             2006-0                      [facility]



       Contrast                                                     [place]



       Allergie                             00:00:                      [address]

 

       No Known DA     Active U             -0                      [facility]



       Drug                                                       [place]



       Allergie                             00:00:                      [address]

 

       No Known DA     Active U             -0                      [facility]



       Food                                                       [place]



       Allergie                             00:00:                      [address]

 

       No Known DA     Active U             2006-0                      [facility]



       Other                                                      [place]



       Allergie                             00:00:                      [address]

 

       NO KNOWN Drug   Active                                           [facility]



       ALLERGIE Class                                                   [facility]



       S                                                              [facility]







Social History







           Social Habit Start Date Stop Date  Quantity   Comments   Source

 

           Tobacco use and 2019 Never used            [facility]



           exposure   00:00:00   00:00:00                         [facility]

 

           Sex Assigned At 1932                       [facility]



           Birth      00:00:00   00:00:00                         [facility]









                Smoking Status  Start Date      Stop Date       Source

 

                Never smoker                                    [facility] ([facility]F/PATRICK/SA)







Medications







       Ordered Filled Start  Stop   Current Ordering Indication Dosage Frequency

 Signature

                    Comments            Components          Source



     Medication Medication Date Date Medication? Clinician                (SIG) 

          



     Name Name                                                   

 

     Levothyroxi      2020-0      Yes                      Take  by           [place]

[place]



     ne 75 mcg      3-18                               mouth.           ity of



     capsule      11:14:                                              [address]

 

     ezetimibe      -0      Yes            10mg      Take 10 mg           [facility]

[place]



     10 mg      3-18                               by mouth           ity of



     tablet      11:12:                               daily.           [place]



               32                                                Medical



                                                                 Branch

 

     gabapentin      2020-0      Yes            400mg      Take 400           [facility]

[place]



     400 mg      3-18                               mg by           ity of



     capsule      10:45:                               mouth 3           [place]



               16                                 (three)           Medical



                                                  times           Branch



                                                  daily.           

 

     losartan 25      -0      Yes            25mg      Take 25 mg           

[facility]



     mg tablet      3-18                               by mouth           ity of



               10:45:                               daily.           [place]



               16                                                Medical



                                                                 Branch

 

     meclizine      -0      Yes                                     [facility]



     25 mg      3-12                                              ity of



     tablet      00:00:                                              [place]



               00                                                Medical



                                                                 Branch

 

     MYRBETRIQ      -0      Yes                                     [facility]



     25 mg      3-03                                              ity of



     tablet      00:00:                                              [place]



               00                                                Medical



                                                                 Branch

 

     montelukast      -0      Yes                                     [facility]

s



     10 mg      3-03                                              ity of



     tablet      00:00:                                              [place]



               00                                                Medical



                                                                 Branch

 

     levothyroxi      -0      Yes                                     Univer

s



     ne 75 mcg      2-11                                              ity of



     tablet      00:00:                                              [place]



               00                                                Medical



                                                                 Branch

 

     Melatonin      -      Yes                      Take  by           Univ

ers



     (MELADOX) 3      2-19                               mouth.           ity of



     mg TbSR      10:19:                                              [address]



                                                                 Branch

 

     meloxicam      2019      Yes            15mg      Take 15 mg           [facility]

[place]



     15 mg      2-19                               by mouth           ity of



     tablet      10:19:                               daily.           [address]



                                                                 Branch

 

     tiZANidine      -      Yes            2mg       Take 2 mg           [facility]

[place]



     2 mg      2-19                               by mouth 3           ity of



     capsule      10:19:                               (three)           [place]



               57                                 times           Medical



                                                  daily.           Branch

 

     HYDROcodone      -      Yes            1{tbl}      Take 1           [facility]

[place]



     -acetaminop      2-19                               tablet by           ity

 of



     hen (NORCO)      10:19:                               mouth           [place]



      mg      57                                 every 6           Medical



     tablet                                         (six)           Branch



                                                  hours as           



                                                  needed.           

 

     Bisacodyl 5      -      Yes                      Take  by           [place]

[place]



     mg Tab      2-19                               mouth.           ity of



               10:19:                                              [place]



               56                                                Medical



                                                                 Branch

 

     diclofenac      -      Yes                      Apply  to           Un

[place]



     sodium      2-19                               area(s).           ity of



     (DICLO GEL)      10:19:                                              [place]



     1 % Kit      [address] [place] [address]



                                                                 Branch

 

     ferrous      -      Yes                      Take  by           Univer

s



     sulfate      2-19                               mouth.           ity of



     (FERROUSUL      10:19:                                              [place]



     ORAL)                                                      Medical



                                                                 Branch

 

     furosemide      -      Yes            40mg      Take 40 mg           U

nivers



     40 mg      2-19                               by mouth           ity of



     tablet      10:19:                               daily.           [place]



               56                                                Medical



                                                                 Branch

 

     spironolact      -      Yes            25mg      Take 25 mg           

[facility]



     one       2-19                               by mouth           ity of



     (ALDACTONE)      10:19:                               daily.           [place]

[place]



     25 mg      55                                                Medical



     tablet                                                        Branch

 

     atorvastati atorvastati           Yes            20mg                     [facility]

[facility]



     n 20 MG n 20 MG                                                   [facility]



     Oral Tablet Oral Tablet                                                   [facility]

[facility]



                                                                 l



                                                                 (LUF/LI



                                                                 V/SA)

 

     Levothyroxi Levothyroxi           Yes            37.5mcg QD                

  [facility]



     ne Oral ne Oral                                                   [facility]



                                                                 l



                                                                 (LUF/LI



                                                                 V/SA)

 

     MAGNESIUM MAGNESIUM           Yes            30mg QD                  [facility]

t



     GLUCONATE GLUCONATE                                                   [facility]

 -



     550 MG Oral 550 MG Oral                                                   M

emoria



     Tablet Tablet                                                   l



                                                                 (LUF/LI



                                                                 V/SA)

 

     Meclizine Meclizine           Yes       vertigo 25mg                     [facility]

[facility]



     Hydrochlori Hydrochlori                                                   L

ukes -



     de 25 MG de 25 MG                                                   [facility]



     Oral Tablet Oral Tablet                                                   l



                                                                 (LUF/LI



                                                                 V/SA)

 

     Multivitami Multivitami           Yes            1tab-ca QD                

  [facility]



     ns   ns                            p                        [facility]



                                                                 [facility]



                                                                 l



                                                                 (LUF/LI



                                                                 V/SA)







Immunizations







           Ordered    Filled Immunization Date       Status     Comments   Sour

e



           Immunization Name Name                                        

 

           SARS-COV-2 COVID-19            2021 Completed             Unive

rsity of



           PFIZER VACCINE            00:00:00                         [facility]

 

           FLU SHOT   FLU SHOT   2014-10-10 Completed             [facility]



                                 00:00:00                         [facility]



                                                                  (LUF/PATRICK/SA)

 

           PNEUMOVAX  PNEUMOVAX  2000-10-10 Completed             [facility]



                                 00:00:00                         [facility]



                                                                  ([facility]/PATRICK/SA)







Procedures







                Procedure       Date / Time Performed Performing Clinician [facility]

e

 

                SARS-COV-2 COVID-19 2021 17:39:54 Doctor Unassigned, No [facility]

[facility] 

[place]



                VACCINE,0.3ML,IM                 Name            [facility]



                (PFIZER)                                        







Encounters







        Start   End     Encounter Admission Attending Care    Care    Encounter 

Source



        Date/Time Date/Time Type    Type    Clinicians Facility Department ID   

   

 

        2021 Imm/Inj         Nurse, Cara Pob Immunization [facility]  

  [id number] 

[id number]                                [facility]



        11:10:00 11:10:00 Visit           Jaime Martinez [id number]

.10         ity kathryn CORDOVA [id number]         [place]

[place]



                                                PROFESSIO [phone]         Me

dical



                                                NAL     [address]             Branch



                                                [facility]                 

 

        2021 Outpatient R       MICHELLE,  [facility]    [id number]

[id number] [facility]



        11:10:00 10:55:19                 JAIME                          ciera [facility]

 

        2020 Outpatient R       JOY,    [facility]    [id number]

-20 [facility]



        11:40:00 11:40:00                 REUBEN                 2011  ity o

[facility]

 

        2020 Outpatient R       JOY,    [facility]    [id number]

158 [facility]



        11:40:00 11:40:00                 REUBEN                         veday o

[facility]

 

        2020 Outpatient R       ROY, [facility]    [id number]

553 [facility]



        14:00:00 14:00:00                 SENDIL                          [facility]

 

        2020 Outpatient R               [facility]    [id number]

-20 [facility]



        13:00:00 13:00:00                                         2006  [facility]

 

        2020 Outpatient R       JOY,    [facility]    [id number]

-20 [facility]



        13:40:00 13:40:00                 REUBEN                 2005  ity o

[facility]

 

        2020 Outpatient R       JOY,    [facility]    [id number]

708 [facility]



        13:40:00 13:40:00                 DAHIANABRIDGET mccollumy o

[facility]

 

        2020 [facility]    [id number]

[id number] 



        08:00:31 08:20:31 ne Visit         Reuben Sommerton [id number]        

 



                                                Jose [id number]         



                                                Professio [phone]         



                                                [address]                 

 

        2020 Outpatient R               [facility]    [id number]

-20 [facility]



        16:00:00 16:00:00                                           [facility]

 

        2020 Outpatient R               [facility]    [id number]

-20 [facility]



        10:00:00 10:00:00                                         2004  [facility]

 

        2020 Outpatient R               [facility]    [id number]

507 [facility]



        10:00:00 10:00:00                                                 [facility]

 

        2020 Outpatient R               [facility]    [id number]

-20 [facility]



        13:00:00 13:00:00                                         2004  [facility]

 

        2020 Office          Joy,    [facility]    [id number]

[id number] 



        10:32:48 11:47:08 Visit           Reuben Rodriguez [id number]         



                                                Jose [id number]         



                                                Yasmin [phone]         



                                                nal     [address]             



                                                Building                 

 

        2020 Outpatient R       JOY,    [facility]    [id number]

-20 Univers



        10:20:00 10:20:00                 REUBEN                 2003  ity o

[facility]

 

        2020 Outpatient R       JOY,    [facility]    [id number]

[address]



        10:20:00 10:20:00                 REUBEN mccollumy o

f



                                                                        [facility]

 

        2020 Outpatient R       JOY,    [facility]    [id number]

-20 [facility]



        10:20:00 10:20:00                 REUBEN                 2003  veday o

[facility]

 

        2020 Emergency X       NOEMI LEROY [facility]    ERT     [id number]

[address] [facility]



        16:15:04 23:22:00                                                 [facility]

 

        2019 Outpatient         BOB, [facility]    [id number]

[id number] [facility]



        10:27:03 23:59:00                 LASHONDA                           [facility]

 

        2019 PAIN IN 3       HARTMAN, [facility]     [id number]

 [facility]



        15:57:00 23:59:00 RIGHT FOOT         MONA PETE ZAMAN, [address]                 [address]



                                                [address]                  [address]



                                                BYPASS,                 (LUF/LI



                                                [place]                 V/SA)



                                                [place], [place]                   



                                                [address]                   

 

        2018 OCCLUSION 3       MOLLY, PARVIN [facility]     [id number]

[id number] [facility]



        13:21:00 23:59:00 & STENOS                 PETE ZAMAN, [address]                 [facility]



                        [address]                 [address]                  [address]



                        ART                     BYPASS,                 (LUF/LI



                                                [place]                 V/SA)



                                                [place], [place]                   



                                                [address]                   

 

        2017 ENC SCR 3       DINORAH,  [facility]     [id number]

 [facility]



        10:41:00 23:59:00 MAMMO           LACONIA [facility]         L

ukes -



                        MALIG                   [address], [address]                 [facility]



                        [address]                 [address]                  [address]



                        BREAST                  BYPASS,                 (LUF/LI



                                                [place]                 V/SA)



                                                [place], [place]                   



                                                [address]                   







Results







           Test Description Test Time  Test Comments Results    Result     Sourc

e



                                                       Comments   

 

           HISTOLOGY  2019                                  



           [place] 12:21:00              [address] [place], [place]            



                                            [address]Llfty:            



                                            [phone]       Fax:            



                                            [phone] #:            



                                            [id number]   Medical            



                                            Director: Parvin Barker M.D.Surgical            



                                            Pathology Consultation            



                                            ReportPatient Name:            



                                            HENRIETTA AMTIAS            



                                            Case #: [id number] Med.            



                                            Rec.                  



                                            #:[id number]:            



                                            PATRICK-PATRICK Surgery Date:            



                                            2019 :            



                                            1932 (Age:            



                                            87)Account #:[id number]            



                                            Received: 2019            



                                            Gender:  F Copy to :            



                                            Reported:             



                                            2019Physician(s):            



                                            Justin Steward            



                                            Specimen(s) ReceivedA:            



                                            Femoral head, right,            



                                            fracture Final            



                                            Pathologic            



                                            DiagnosisBone, right            



                                            femoral head:-            



                                            changes compatible with            



                                            fracture SITE and            



                                            osteoporosis,            



                                            noevidencemalignancy.            



                                                ***Electronically            



                                            Signed Out***            



                                            /2019 Anu Parra MD, Board            



                                            Certified in Anatomic            



                                            Pathology Clinical            



                                            HistoryRight hip            



                                            fracture.Gross            



                                            DescriptionSpecimen            



                                            labeled right femoral            



                                            head consists of a            



                                            femoral head            



                                            withportion offemoral            



                                            neck measuring 7.5 x            



                                            4.5 x 4.5 cm.  The            



                                            articular surface            



                                            showsareasof            



                                            eburnation.  The            



                                            trabecular bone is            



                                            hemorrhagic.            



                                            Additionally in            



                                            thecontainer, there are            



                                            two fragments of bone            



                                            measuring 4 x 1.5 x 1            



                                            and 4 x2.5 x1.5 cm.            



                                            Representative sections            



                                            are submitted or            



                                            permanent sections            



                                            inacassette labeled A1            



                                            after decalcification.            



                                            /2019 Anu Parra MD, Board            



                                            Certified in Anatomic            



                                            Pathology  Microscopic            



                                            DescriptionBone with            



                                            thin                  



                                            fragmented/necrotic            



                                            trabeculae with            



                                            intertrabecularhemorrha            



                                            ge.The surface            



                                            cartilage shows            



                                            degenerative changes            



                                            with fibrillation            



                                            ofthesurface and            



                                            clonation of            



                                            chondrocytes.  There is            



                                            no evidence            



                                            malignancy.Billing Fee            



                                            Code(s): A; 12620,            



                                            94673                 

 

           CULTURE, URINE 2019            Specimen: Urine            



                      07:36:00              SpecimensCollected:            



                                            2019 09:38            



                                            Status: Final      Last            



                                            Updated: 2019            



                                            07:36          Culture            



                                            Result (Final) (Final)            



                                              >100,000 cc/mL Gram            



                                            Negative Bacilli            



                                            Isolate (Final) (C)            



                                            (Final)               



                                            ***Escherichia coli            



                                            ESBL***    Strains of            



                                            Klebsiella spp. and            



                                            Escherichia coli may be            



                                            clinically    resistant            



                                            to cephalosporin and            



                                            aztreonam therapy by            



                                            virtue of extended-            



                                            spectrum B-lactamase            



                                            (ESBL) production,            



                                            despite apparent in            



                                            vitro    susceptibility            



                                            to some of these            



                                            agents.   Multi-drug            



                                            resistantorganism            



                                            isolated.             



                                            Recommendation------Iso            



                                            lation protocol.            



                                            Amikacin           <=2            



                                                 S      Ampicillin            



                                                   >=32      R            



                                            Ampicillin/Sulbac  >=32            



                                                 R      Cefazolin            



                                                   >=64      R            



                                            Cefepime           <=1            



                                                 S      Cefoxitin            



                                                   8         S            



                                            Ceftazidime        <=1            



                                                 S      Ceftriaxone            



                                                   <=1       S            



                                            Ciprofloxacin            



                                            <=0.25    S            



                                            Gentamicin         <=1            



                                                 S                



                                            Levofloxacin            



                                            <=0.12    S            



                                            Meropenem             



                                            <=0.25    S            



                                            Nitrofurantoin     128            



                                                 R                



                                            Piperacillin/Tazo  <=4            



                                                 S      Tobramycin            



                                                   <=1       S            



                                            Trimeth/Sulfa      <=20            



                                                 S      ESBL            



                                                   Positive  +            

 

           XR HIP COMP 2-3 2019            Right hip 2            



           views      16:45:16              views:History: Right            



                                            hip painAP and frog-leg            



                                            lateral views of the            



                                            right hip were            



                                            obtained. Comparison is            



                                            madeto 19. A right            



                                            hip replacement is            



                                            again noted. The            



                                            hardware is intact            



                                            andis unchanged in            



                                            position. No new            



                                            findings are noted. The            



                                            lesser trochanter            



                                            againprojects is a            



                                            separate fragment            



                                            medial to the            



                                            prosthesis as on the            



                                            prior study.There is            



                                            some residual            



                                            postoperative air in            



                                            the soft tissues about            



                                            the hip.Impression:            



                                            Stable changes of            



                                            recent right hip            



                                            arthroplasty.This final            



                                            report was            



                                            electronically signed            



                                            by Dr Lokesh Esposito MD [id number]            



                                            PMDictated By: LOKESH ESPOSITODate:            



                                            2019 16:38            









                    RBC, Leukoreduced   2019 12:19:00 









                      Test Item  Value      Reference Range Interpretation Comme

nts









             RBC Unit (test code = RBCUNIT) Released for Transfusion            

               



CROSSMATCH x  11:58:00





             Test Item    Value        Reference Range Interpretation Comments

 

             Crossmatch (test code = Completed: Compatible                      

     



             XMATCH)                                             



TYPE &amp; [id number] 07:24:00





             Test Item    Value        Reference Range Interpretation Comments

 

             ABO Blood Type (test code = ABO) A                                 

     

 

             Rh (test code = RH) Positive                               

 

             Antibody Screen (test code = ABSCR) Negative     Negative     N    

        



[id number] 05:44:00





             Test Item    Value        Reference Range Interpretation Comments

 

             Glucose (test code 111 mg/dl           H            



             = GLU)                                              

 

             BUN (test code = 14.0 mg/dl   6.0-17.0                  



             BUN)                                                

 

             Creatinine (test 0.6 mg/dl    0.4-1.2                   



             code = CREA)                                        

 

             Sodium (test code = 131 mmol/l   137-145      L            



             NA)                                                 

 

             Potassium (test 4.5 mmol/l   3.5-5.0                   



             code = K)                                           

 

             Chloride (test code 100 mmol/l                       



             = CL)                                               

 

             CO2 (test code = 25 mmol/l    22-30                     



             CO2)                                                

 

             Calcium (test code 8.1 mg/dl    8.4-10.2     L            



             = CALC)                                             

 

             EGFR if  >60                                    



             American (test code mL/min/1.73m\                           



             = EGFRAA)    S\2                                    

 

             EGFR if Non- >60                                    Estimate

d Glomerular



             American (test code mL/min/1.73m\                           Filtrat

ion Rate (eGFR)



             = EGFRNA)    S\2                                    Reference Inter

vals



                                                                 Decision Points

 for 18



                                                                 years and older

 and



                                                                 average body ma

ss:



                                                                  >=  60



                                                                    Does not exc

lude



                                                                 kidney disease.

 30 -



                                                                 59



                                                                 Suggests modera

te



                                                                 chronic kidney 

disease



                                                                 and



                                                                         indicat

es the



                                                                 need for furthe

r



                                                                 investigation



                                                                 



                                                                 including asses

sment



                                                                 of proteinuria 

and



                                                                 



                                                                 



                                                                 cardiovascular



                                                                 factors. < 30



                                                                      Usually in

dicates



                                                                 a need for refe

rral



                                                                 for assessment



                                                                                

    and



                                                                 management of c

hronic



                                                                 kidney failure.



CBC WITH AUTO [id number] 05:40:00





             Test Item    Value        Reference Range Interpretation Comments

 

             WBC (test code = WBC) 10.32 10\S\3/ul 4.80-10.80                

 

             RBC (test code = RBC) 2.52 10\S\6/ul 4.20-5.40    L            

 

             Hemoglobin (test code = HGB) 7.8 gm/dl    12.0-14.0    L           

 

 

             Hematocrit (test code = HCT) 23.2 %       37.0-47.0    L           

 

 

             MCV (test code = MCV) 92.1 fL      81.0-99.0                 

 

             MCH (test code = MCH) 31.0 pg      27.0-31.0                 

 

             MCHC (test code = MCHC) 33.6 gm/dl   33.0-37.0                 

 

             RDW (test code = RDWVC) 13.9 %       11.5-14.5                 

 

             Platelet (test code = PLT) 221 10\S\3/ul 130-400                   

 

             MPV (test code = MPV) 9.3 fL       7.4-10.4     A            

 

             NE% (test code = NE) 68.9 %       42.0-75.0                 

 

             LY% (test code = LY) 18.3 %       13.0-42.0                 

 

             MO% (test code = MO) 10.5 %       4.0-14.0                  

 

             EO% (test code = EO) 1.2 %        1.0-5.0                   

 

             BA% (test code = BA) 0.5 %        0.0-3.0                   

 

             IG% (test code = IG%) 0.6 %        0.0-0.4      H            



[id number] 05:39:00





             Test Item    Value        Reference Range Interpretation Comments

 

             Glucose (test code 141 mg/dl           H            



             = GLU)                                              

 

             BUN (test code = 10.0 mg/dl   6.0-17.0                  



             BUN)                                                

 

             Creatinine (test 0.5 mg/dl    0.4-1.2                   



             code = CREA)                                        

 

             Sodium (test code = 132 mmol/l   137-145      L            



             NA)                                                 

 

             Potassium (test 3.7 mmol/l   3.5-5.0                   



             code = K)                                           

 

             Chloride (test code 100 mmol/l                       



             = CL)                                               

 

             CO2 (test code = 26 mmol/l    22-30                     



             CO2)                                                

 

             Calcium (test code 7.9 mg/dl    8.4-10.2     L            



             = CALC)                                             

 

             EGFR if  >60                                    



             American (test code mL/min/1.73m\                           



             = EGFRAA)    S\2                                    

 

             EGFR if Non- >60                                    Estimate

d Glomerular



             American (test code mL/min/1.73m\                           Filtrat

ion Rate (eGFR)



             = EGFRNA)    S\2                                    Reference Inter

vals



                                                                 Decision Points

 for 18



                                                                 years and older

 and



                                                                 average body ma

ss:



                                                                  >=  60



                                                                    Does not exc

lude



                                                                 kidney disease.

 30 -



                                                                 59



                                                                 Suggests modera

te



                                                                 chronic kidney 

disease



                                                                 and



                                                                         indicat

es the



                                                                 need for furthe

r



                                                                 investigation



                                                                 



                                                                 including asses

sment



                                                                 of proteinuria 

and



                                                                 



                                                                 



                                                                 cardiovascular



                                                                 factors. < 30



                                                                      Usually in

dicates



                                                                 a need for refe

rral



                                                                 for assessment



                                                                                

    and



                                                                 management of c

hronic



                                                                 kidney failure.



CBC WITH AUTO [id number] 05:20:00





             Test Item    Value        Reference Range Interpretation Comments

 

             WBC (test code = 13.64        4.80-10.80   H            



             WBC)         10\S\3/ul                              

 

             RBC (test code = 2.83 10\S\6/ul 4.20-5.40    L            



             RBC)                                                

 

             Hemoglobin (test 8.9 gm/dl    12.0-14.0    L            



             code = HGB)                                         

 

             Hematocrit (test 26.8 %       37.0-47.0    L            



             code = HCT)                                         

 

             MCV (test code = 94.7 fL      81.0-99.0                 



             MCV)                                                

 

             MCH (test code = 31.4 pg      27.0-31.0    H            



             MCH)                                                

 

             MCHC (test code = 33.2 gm/dl   33.0-37.0                 



             MCHC)                                               

 

             RDW (test code = 13.6 %       11.5-14.5                 



             RDWVC)                                              

 

             Platelet (test code 243 10\S\3/ul 130-400                   



             = PLT)                                              

 

             MPV (test code = 9.1 fL       7.4-10.4     A            "NOT MEASUR

ED"



             MPV)                                                RESULTS ARE DIS

PLAYED



                                                                 WHEN THE INSTRU

MENT



                                                                 HAS A SUPPRESSE

D OR



                                                                 UNREPORTABLE RE

SULT.



                                                                 THIS WILL MOST 

OFTEN



                                                                 HAPPEN WITH THE

 MPV



                                                                 WHEN THERE IS A

N



                                                                 ABNORMAL PLATEL

ET



                                                                 DISTRIBUTION DU

E TO A



                                                                 CRITICAL LOW VA

LUE OR



                                                                 PLATELET CLUMPI

NG.



                                                                 THE RDW MAY BE



                                                                 SUPPRESSED IF T

HERE



                                                                 ARE MULTIPLE PE

AKS



                                                                 PRESENT ON THE 

RBC



                                                                 HISTOGRAM.  IN 

THIS



                                                                 CASE, A MANUAL 

REVIEW



                                                                 OF THE SLIDE WI

LL BE



                                                                 PERFORMED, AND 

RBC



                                                                 MORPHOLOGY WILL

 BE



                                                                 NOTED ON THE RE

PORT.

 

             NE% (test code = 70.3 %       42.0-75.0                 



             NE)                                                 

 

             LY% (test code = 17.7 %       13.0-42.0                 



             LY)                                                 

 

             MO% (test code = 10.6 %       4.0-14.0                  



             MO)                                                 

 

             EO% (test code = 0.2 %        1.0-5.0      L            



             EO)                                                 

 

             BA% (test code = 0.4 %        0.0-3.0                   



             BA)                                                 

 

             IG% (test code = 0.8 %        0.0-0.4      H            



             IG%)                                                



[id number] 05:24:00





             Test Item    Value        Reference Range Interpretation Comments

 

             Glucose (test code 111 mg/dl           H            



             = GLU)                                              

 

             BUN (test code = 10.0 mg/dl   6.0-17.0                  



             BUN)                                                

 

             Creatinine (test 0.6 mg/dl    0.4-1.2                   



             code = CREA)                                        

 

             Sodium (test code = 134 mmol/l   137-145      L            



             NA)                                                 

 

             Potassium (test 4.4 mmol/l   3.5-5.0                   



             code = K)                                           

 

             Chloride (test code 102 mmol/l                       



             = CL)                                               

 

             CO2 (test code = 28 mmol/l    22-30                     



             CO2)                                                

 

             Calcium (test code 7.7 mg/dl    8.4-10.2     L            



             = CALC)                                             

 

             EGFR if  >60                                    



             American (test code mL/min/1.73m\                           



             = EGFRAA)    S\2                                    

 

             EGFR if Non- >60                                    Estimate

d Glomerular



             American (test code mL/min/1.73m\                           Filtrat

ion Rate (eGFR)



             = EGFRNA)    S\2                                    Reference Inter

vals



                                                                 Decision Points

 for 18



                                                                 years and older

 and



                                                                 average body ma

ss:



                                                                  >=  60



                                                                    Does not exc

lude



                                                                 kidney disease.

 30 -



                                                                 59



                                                                 Suggests modera

te



                                                                 chronic kidney 

disease



                                                                 and



                                                                         indicat

es the



                                                                 need for furthe

r



                                                                 investigation



                                                                 



                                                                 including asses

sment



                                                                 of proteinuria 

and



                                                                 



                                                                 



                                                                 cardiovascular



                                                                 factors. < 30



                                                                      Usually in

dicates



                                                                 a need for refe

rral



                                                                 for assessment



                                                                                

    and



                                                                 management of c

hronic



                                                                 kidney failure.



CBC WITH AUTO [id number] 05:07:00





             Test Item    Value        Reference Range Interpretation Comments

 

             WBC (test code = 7.73 10\S\3/ul 4.80-10.80                



             WBC)                                                

 

             RBC (test code = 2.89 10\S\6/ul 4.20-5.40    L            



             RBC)                                                

 

             Hemoglobin (test 8.9 gm/dl    12.0-14.0    L            



             code = HGB)                                         

 

             Hematocrit (test 27.2 %       37.0-47.0    L            



             code = HCT)                                         

 

             MCV (test code = 94.1 fL      81.0-99.0                 



             MCV)                                                

 

             MCH (test code = 30.8 pg      27.0-31.0                 



             MCH)                                                

 

             MCHC (test code = 32.7 gm/dl   33.0-37.0    L            



             MCHC)                                               

 

             RDW (test code = 13.5 %       11.5-14.5                 



             RDWVC)                                              

 

             Platelet (test code 214 10\S\3/ul 130-400                   



             = PLT)                                              

 

             MPV (test code = 9.1 fL       7.4-10.4     A            "NOT MEASUR

ED"



             MPV)                                                RESULTS ARE DIS

PLAYED



                                                                 WHEN THE INSTRU

MENT



                                                                 HAS A SUPPRESSE

D OR



                                                                 UNREPORTABLE RE

SULT.



                                                                 THIS WILL MOST 

OFTEN



                                                                 HAPPEN WITH THE

 [facility]PV



                                                                 WHEN THERE IS A

N



                                                                 ABNORMAL PLATEL

ET



                                                                 DISTRIBUTION DU

E TO A



                                                                 CRITICAL LOW VA

LUE OR



                                                                 PLATELET CLUMPI

NG.



                                                                 THE RDW MAY BE



                                                                 SUPPRESSED IF T

HERE



                                                                 ARE MULTIPLE PE

AKS



                                                                 PRESENT ON THE 

RBC



                                                                 HISTOGRAM.  IN 

THIS



                                                                 CASE, A MANUAL 

REVIEW



                                                                 OF THE SLIDE WI

LL BE



                                                                 PERFORMED, AND 

RBC



                                                                 MORPHOLOGY WILL

 BE



                                                                 NOTED ON THE RE

PORT.

 

             NE% (test code = 59.7 %       42.0-75.0                 



             NE)                                                 

 

             LY% (test code = 26.1 %       13.0-42.0                 



             LY)                                                 

 

             MO% (test code = 12.8 %       4.0-14.0                  



             MO)                                                 

 

             EO% (test code = 0.4 %        1.0-5.0      L            



             EO)                                                 

 

             BA% (test code = 0.6 %        0.0-3.0                   



             BA)                                                 

 

             IG% (test code = 0.4 %        0.0-0.4                   



             IG%)                                                



XR HIP [id number] 18:25:40Right hip 2 - viewsHISTORY: Fracture.COMPARISON: 
2019 at 11:07 AM.FINDINGS:Intraoperative film demonstrating right 
hip replacement and ORIF ofintertrochanteric fracture in progress. Overlying 
artifact. Vascularcalcifications. Remote fractures of the left superior inferior
pubic rami.IMPRESSION:Intraoperative film demonstrating right hip replacement 
and ORIF ofintertrochanteric fracture in progress.This final report was 
electronically signed by Dr Aayush Mejia MD [id number] PMDictated By: 
AAYUSH MEJIADate:  2019 18:19XR HIP COMP 2-3  [id number] 16:46:16To 
be done STAT in [facility] femur 2 - viewsHISTORY:  Postoperative evaluation of 
fracture repair.COMPARISON: 2019 at 8:15 AM.FINDINGS:Status post 
total right hip replacement with a prosthesis in adequate position.There is also
a fracture fixation with cerclage wires and curved metallic plateand screw 
constructalong the greater trochanter. Hardware appears in adequateposition. 
Postoperative soft tissue swelling and gas. Remote fractures of leftsuperior and
inferior rami.IMPRESSION:Status post total right hipreplacement with a 
prosthesis in adequate position.Status post ORIF with cerclage wires and curved 
metallic plate and screwconstruct along the greater trochanterThis final report 
was electronically signed by Dr Aayush Mejia MD 20194:39 PMDictated By: 
AAYUSH MEJIADate:  2019 16:39URINALYSIS WITH [id number] 
10:23:00





             Test Item    Value        Reference Range Interpretation Comments

 

             Color (test code = UCOLR) Lt. Yellow                             

 

             Clarity (test code = UCLAR) Clear                                  

 

             Glucose (test code = UGLUC) NEGATIVE     NEGATIVE     N            

 

             Bilirubin (test code = UBILI) NEGATIVE     NEGATIVE     N          

  

 

             Ketones (test code = UKET) NEGATIVE     NEGATIVE     N            

 

             Specific Gravity (test code = 1.010        1.005-1.030  A          

  



             USPGR)                                              

 

             Blood (test code = UBLD) Trace-Intact NEGATIVE     A            

 

             PH (test code = UPH) 7.5          4.5-8.0      A            

 

             Protein (test code = UPROT) NEGATIVE     NEGATIVE     N            

 

             Urobilinogen (test code = U 0.2          >0.2         N            



             UROB)                                               

 

             Nitrite (test code = UNITR) Positive     NEGATIVE     A            

 

             Leukocyte Esterase (test code = Moderate     NEGATIVE     A        

    



             ULEUK)                                              

 

             WBC (test code = WBCUR) 30-40        0-5          A            

 

             RBC (test code = RBCUR) 0-5          0-5          N            

 

             Epithial Cells (test code = U 0-1          0-10         A          

  



             EPI)                                                

 

             Bacteria (test code = UBACT) 2+           None Seen,Trace A        

    



TROPONIN-I [id number] 09:16:00





             Test Item    Value        Reference Range Interpretation Comments

 

             Troponin-I (test <0.015 ng/ml 0.000-0.034  N            The 99th Pe

rcentile URL



             code = TROP)                                        is 0.045 ng/mL 

for the



                                                                 [facility] [place] T

roponin I.



                                                                  The Joint Euro

pean



                                                                 Society of



                                                                 Cardiology/Amer

[facility]



                                                                 College of Card

iology



                                                                 (ESC/ACC) and t

jose c



                                                                 [facility]



                                                                 Clinical Bioche

radha



                                                                 Standards of [place]

boratory



                                                                 Practices (NACB

)



                                                                 recommends that

 the



                                                                 diagnosis of AM

I includes



                                                                 the presence of

 clinical



                                                                 history suggest

jean of



                                                                 Acute Coronary 

Syndrome



                                                                 (ACS) and a max

imum



                                                                 concentration o

f cardiac



                                                                 troponin exceed

ing the



                                                                 99th percentile

 of a



                                                                 normal referenc

e



                                                                 population [upp

er



                                                                 reference limit

 (URL)] on



                                                                 at least one oc

casion



                                                                 during the firs

t 24 hours



                                                                 after the clini

mitchell event.



                                                                 



PRO-BNP(B-Type Natriuretic Peptide)2019 09:16:00





             Test Item    Value        Reference Range Interpretation Comments

 

             Pro-BNP(B-Peptide) (test code = 141 pg/ml    0-450                 

    



             PROBNP)                                             



PT AND [id number] 09:15:00





             Test Item    Value        Reference Range Interpretation Comments

 

             Protime (test code 10.7 seconds 9.0-11.8                  



             = PT)                                               

 

             INR (test code = 1.0          0.9-1.1                   INR results

 are



             INR)                                                intended ONLY t

o



                                                                 monitor Oral



                                                                 Anticoagulant t

herapy



                                                                 in stablized pa

tients.



                                                                 The INR Therape

utic



                                                                 Range is 2.0 - 

3.0



                                                                 Patients with a



                                                                 mechanical hear

t, the



                                                                 INR Range is 2.

5 - 3.5



[id number] 09:15:00





             Test Item    Value        Reference Range Interpretation Comments

 

             aPTT (test code = PTT) 26.3 seconds 25.3-35.7                 



[id number] 09:14:00





             Test Item    Value        Reference Range Interpretation Comments

 

             Glucose (test code 115 mg/dl           H            



             = GLU)                                              

 

             BUN (test code = 15.0 mg/dl   6.0-17.0                  



             BUN)                                                

 

             Creatinine (test 0.7 mg/dl    0.4-1.2                   



             code = CREA)                                        

 

             Sodium (test code = 132 mmol/l   137-145      L            



             NA)                                                 

 

             Potassium (test 4.2 mmol/l   3.5-5.0                   



             code = K)                                           

 

             Chloride (test code 99 mmol/l                        



             = CL)                                               

 

             CO2 (test code = 28 mmol/l    22-30                     



             CO2)                                                

 

             Calcium (test code 8.4 mg/dl    8.4-10.2                  



             = CALC)                                             

 

             T Protein (test 8.2 gm/dl    5.1-8.7                   



             code = TP)                                          

 

             Albumin (test code 3.7 gm/dl    3.5-4.6                   



             = ALB)                                              

 

             A/G Ratio (test 0.8 %        1.1-2.2      L            



             code = AGRAT)                                        

 

             AST (SGOT) (test 36 U/L       11-36                     



             code = AST)                                         

 

             ALT (SGPT) (test 16 U/L       11-40                     



             code = ALT)                                         

 

             Alkaline Phos (test 51 U/L                           



             code = ALKP)                                        

 

             Total Bilirubin 0.5 mg/dl    0.2-1.2                   



             (test code = TBIL)                                        

 

             Globulin (test code 4.5 gm/dl    2.3-3.5      H            



             = GLOBU)                                            

 

             Calcium, Corrected 8.6 mg/dl    8.4-10.2                  Various f

ormulas exist



             (test code =                                        for corrected s

nette



             CALCCORR)                                           calcium results

, each



                                                                 yielding differ

ent



                                                                 values.  This



                                                                 corrected resul

t was



                                                                 based on the fo

rmula:



                                                                 Corrected Calci

um =



                                                                 SerumCalcium + 

[0.8 *



                                                                 ( 4 - SerumAlbu

min)]

 

             EGFR if  >60                                    



             American (test code mL/min/1.73m\                           



             = EGFRAA)    S\2                                    

 

             EGFR if Non- >60                                    Estimate

d Glomerular



             American (test code mL/min/1.73m\                           Filtrat

ion Rate (eGFR)



             = EGFRNA)    S\2                                    Reference Inter

vals



                                                                 Decision Points

 for 18



                                                                 years and older

 and



                                                                 average body ma

ss:



                                                                  >=  60



                                                                    Does not exc

lude



                                                                 kidney disease.

 30 -



                                                                 59



                                                                 Suggests modera

te



                                                                 chronic kidney 

disease



                                                                 and



                                                                         indicat

es the



                                                                 need for furthe

r



                                                                 investigation



                                                                 



                                                                 including asses

sment



                                                                 of proteinuria 

and



                                                                 



                                                                 



                                                                 cardiovascular



                                                                 factors. < 30



                                                                      Usually in

dicates



                                                                 a need for refe

rral



                                                                 for assessment



                                                                                

    and



                                                                 management of c

hronic



                                                                 kidney failure.



CBC WITH AUTO [id number] 09:05:00





             Test Item    Value        Reference Range Interpretation Comments

 

             WBC (test code = WBC) 12.90 10\S\3/ul 4.80-10.80   H            

 

             RBC (test code = RBC) 4.00 10\S\6/ul 4.20-5.40    L            

 

             Hemoglobin (test code = HGB) 12.2 gm/dl   12.0-14.0                

 

 

             Hematocrit (test code = HCT) 36.5 %       37.0-47.0    L           

 

 

             MCV (test code = MCV) 91.3 fL      81.0-99.0                 

 

             MCH (test code = MCH) 30.5 pg      27.0-31.0                 

 

             MCHC (test code = MCHC) 33.4 gm/dl   33.0-37.0                 

 

             RDW (test code = RDWVC) 13.2 %       11.5-14.5                 

 

             Platelet (test code = PLT) 309 10\S\3/ul 130-400                   

 

             MPV (test code = MPV) 8.5 fL       7.4-10.4     A            

 

             NE% (test code = NE) 82.9 %       42.0-75.0    H            

 

             LY% (test code = LY) 10.0 %       13.0-42.0    L            

 

             MO% (test code = MO) 6.2 %        4.0-14.0                  

 

             EO% (test code = EO) 0.2 %        1.0-5.0      L            

 

             BA% (test code = BA) 0.5 %        0.0-3.0                   

 

             IG% (test code = IG%) 0.2 %        0.0-0.4                   



XR HIP COMP 2-3  [id number] 08:46:11Right hip 2 - viewsHISTORY:  Pain and 
decreased range of motion status post fall.COMPARISON: NoneFINDINGS:There is 
intertrochanteric fracture of the right femur, with approximately 1.9cm superior
displacement of the distal fracture fragment with overriding.Degenerative 
changes of the right hip, lower lumbar spine and SI joints.Vascular 
calcificationsIMPRESSION:Displaced intertrochanteric fracture of the right 
femur.This final report was electronically signed by Dr Aayush Mejia MD 
[id number] AMDictated By: AAYUSH MEJIADate:  2019 08:39US CAROTID 
BILAT [id number] 12:56:46Carotid Doppler ultrasound:History: Smita 
stenosis, history of left CEADuplex scanning, spectral analysis and real-time 
grayscale and color Dopplerimaging of the extracranial cerebrovascular system 
was performed.There is mixed plaque in the right common carotid artery, 
bifurcation andproximal ICA. Limited soft plaque is noted on the left. The 
spectral waveformsshow no significant abnormality on the left and spectral 
broadening in the ICAon the right.The peak systolic velocity in the right ICA is
127 cm/sec. The peakend-diastolic velocity is 20 cm/s.The peak systolic velocity
in the left ICA is 111cm/sec. The end-diastolicvelocity is 29 cm/s.The ICA/CCA 
ratio on the right is 2.5.The ICA/CCA ratioon the left is 1.8.Antegrade flow is 
noted in the vertebral arteries bilaterally.Impression:1. Doppler findings 
consistent with 50-69% stenosis in the proximal right ICA.2. No hemodynamically 
significant stenosis by Doppler on the left.3. Antegrade vertebral artery flow 
is present bilaterally.This final report was electronically signed by Dr Lokesh Esposito MD 201912:50 PMDictated By: LOKESH ESPOSITODate:  2019 12:50
XR FOOT COMP MIN 3 [id number] 17:02:13Right foot 3 viewsDATE OF EXAM: 
2019INDICATION: Right foot pain after recent fallDISCUSSION: AP,oblique and 
lateral views were obtained. No fracture ordislocation is identified. The joint 
spaces are preserved. Mild osteopenia isnoted. The soft tissues show no 
significant abnormality.IMPRESSION:Noacute bony or joint abnormality.This final 
report was electronically signed by Dr Lokesh Esposito MD20194:55 PMDictated
By: Tai ESPOSITOte:  2019 16:55XR ANKLE COMP MIN 3 [id number] 
16:44:46Right ankle 3 views:History: Pain and swelling, recent fallAP, oblique 
and lateral views were obtained. Diffuse soft tissue swelling ispresent, greater
medially and anteriorly. There is no fracture or dislocation.No joint effusion 
is noted. The ankle mortise is intact. Mild osteopenia isnoted.Impression: Soft 
tissue swelling with no acute bony or joint abnormalityidentified.This final 
report was electronically signed by Dr Lokesh Esposito MD 20194:38 
PMDictated By: Tai ESPOSITOte:  2019 16:38US CAROTID BILAT Doppler
2018 15:03:39Carotid Doppler ultrasound:History: Carotid stenosis, history
of left CEA in 2017Duplex scanning, spectral analysis and real-time grayscale 
and color Dopplerimaging of the extracranial cerebrovascular system was 
performed.There is irregular mixed plaque noted in the right carotid bulb and 
ICA. Changesof left carotid endarterectomy are present.The peak systolic 
velocity in the right ICA is 125 cm/sec.The peak systolic velocity in the left 
ICA is 135 cm/sec.The ICA/CCA ratio on the right is 1.8.The ICA/CCA ratio on the
left is 2.2.Antegrade flow is noted in the vertebral arteries 
bilaterally.Impression:1. Atherosclerotic changes of the right with Doppler 
findings consistent udwd82-10% stenosis. Similar findings were noted on prior 
study of .2. Interval changes of left carotid endarterectomy 
with no findings by Dopplerto indicate restenosis.This final report was 
electronically signed by Dr Lokesh Esposito MD 20182:57 PMDictated By: 
LOKESH ESPOSITODate:  2018 15:03MM MAMMO SCRN 3D [id number] 16:13:23
Procedure:  MM MAMMO SCREEN DIR DIGITAL, MM MAMMO SCRN 3D TOMOExam Date:  
2017[facility] Indication:  
ScreeningComparison:  Lesley 10, 2016 and 2011Findings: 3-D 
tomosynthesis views of both breasts were obtained. The study isinterpreted using
computer-aided detection (CAD).There are scattered fibroglandular densities 
bilaterally. No dominant mass orarchitectural distortion is identified. There 
are no suspicious calcifications.Impression:  No mammographic evidence of 
malignancy.BI-RADS 1: Negative.This final report was electronically signed by Dr Lokesh Esposito MD 20174:07 PMDictated By: Tai ESPOSITOte:  
2017 16:13MM MAMMO SCREEN DIR [id number] 16:13:20Procedure:  MM 
MAMMO SCREEN DIR DIGITAL, MM MAMMO SCRN 3D TOMOExam Date:  2017Ordering 
Provider:  [facility] Indication:  ScreeningComparison:  Lesley 10, 
2016 and 2011Findings: 3-D tomosynthesis views of both breasts were
obtained. The study isinterpreted using computer-aided detection (CAD).There are
scattered fibroglandular densities bilaterally. No dominant mass orarchitectural
distortion is identified. There are no suspicious calcifications.Impression:  No
mammographic evidence of malignancy.BI-RADS 1: Negative.This final report was 
electronically signed by Dr Lokesh Esposito MD 20174:07 PMDictated By: 
Tai ESPOSITOte:  2017 16:[id number] 12:18:00





             Test Item    Value        Reference Range Interpretation Comments

 

             Glucose (test code 88 mg/dl                         



             = GLU)                                              

 

             BUN (test code = 14.0 mg/dl   6.0-17.0                  



             BUN)                                                

 

             Creatinine (test 0.7 mg/dl    0.4-1.2                   



             code = CREA)                                        

 

             Sodium (test code = 135 mmol/l   137-145      L            



             NA)                                                 

 

             Potassium (test 4.6 mmol/l   3.5-5.0                   



             code = K)                                           

 

             Chloride (test code 97 mmol/l           L            



             = CL)                                               

 

             CO2 (test code = 27 mmol/l    22-30                     



             CO2)                                                

 

             Anion Gap (test 12                                     



             code = GAP)                                         

 

             Calcium (test code 9.3 mg/dl    8.4-10.2                  



             = CALC)                                             

 

             T Protein (test 7.8 gm/dl    5.1-8.7                   



             code = TP)                                          

 

             Albumin (test code 4.1 gm/dl    3.5-4.6                   



             = ALB)                                              

 

             A/G Ratio (test 1.1 %        1.1-2.2                   



             code = AGRAT)                                        

 

             AST (SGOT) (test 21 U/L       11-36                     



             code = AST)                                         

 

             ALT (SGPT) (test 19 U/L       11-40                     



             code = ALT)                                         

 

             Alkaline Phos (test 80 U/L                           



             code = ALKP)                                        

 

             Total Bilirubin 0.7 mg/dl    0.2-1.2                   



             (test code = TBIL)                                        

 

             Globulin (test code 3.7 gm/dl    2.3-3.5      H            



             = GLOBU)                                            

 

             Calcium, Corrected 9.2 mg/dl    8.4-10.2                  Various f

ormulas exist



             (test code =                                        for corrected s

nette



             CALCCORR)                                           calcium results

, each



                                                                 yielding differ

ent



                                                                 values.  This



                                                                 corrected resul

t was



                                                                 based on the fo

rmula:



                                                                 Corrected Calci

um =



                                                                 SerumCalcium + 

[0.8 *



                                                                 ( 4 - SerumAlbu

min)]

 

             EGFR if  >60                                    



             American (test code mL/min/1.73m\                           



             = EGFRAA)    S\2                                    

 

             EGFR if Non- >60                                    Estimate

d Glomerular



             American (test code mL/min/1.73m\                           Filtrat

ion Rate (eGFR)



             = EGFRNA)    S\2                                    Reference Inter

vals



                                                                 Decision Points

 for 18



                                                                 years and older

 and



                                                                 average body ma

ss:



                                                                  >=  60



                                                                    Does not exc

lude



                                                                 kidney disease.

 30 -



                                                                 59



                                                                 Suggests modera

te



                                                                 chronic kidney 

disease



                                                                 and



                                                                         indicat

es the



                                                                 need for furthe

r



                                                                 investigation



                                                                 



                                                                 including asses

sment



                                                                 of proteinuria 

and



                                                                 



                                                                 



                                                                 cardiovascular



                                                                 factors. < 30



                                                                      Usually in

dicates



                                                                 a need for refe

rral



                                                                 for assessment



                                                                                

    and



                                                                 management of c

hronic



                                                                 kidney failure.



[id number] 12:08:00





             Test Item    Value        Reference Range Interpretation Comments

 

             aPTT (test code = PTT) 25.4 seconds 25.3-35.7                 



PT AND [id number] 12:08:00





             Test Item    Value        Reference Range Interpretation Comments

 

             Protime (test code 10.0 seconds 9.0-11.8                  



             = PT)                                               

 

             INR (test code = 1.0          0.9-1.1                   INR results

 are



             INR)                                                intended ONLY t

o



                                                                 monitor Oral



                                                                 Anticoagulant t

herapy



                                                                 in stablized pa

tients.



                                                                 The INR Therape

utic



                                                                 Range is 2.0 - 

3.0



                                                                 Patients with a



                                                                 mechanical hear

t, the



                                                                 INR Range is 2.

5 - 3.5



CBC (HEMOGRAM ONLY)2017 12:03:00





             Test Item    Value        Reference Range Interpretation Comments

 

             WBC (test code = WBC) 10.9 k/ul    4.8-10.8     H            

 

             RBC (test code = RBC) 3.93 Millions/ul 4.20-5.40    L            

 

             Hemoglobin (test code = HGB) 11.9 gm/dl   12.0-14.0    L           

 

 

             Hematocrit (test code = HCT) 36.3 %       37.0-47.0    L           

 

 

             MCV (test code = MCV) 92.3 fL      81.0-99.0                 

 

             MCH (test code = MCH) 30.4 pg      27.0-31.0                 

 

             MCHC (test code = MCHC) 32.9 gm/dl   33.0-37.0    L            

 

             RDW (test code = RDWVC) 14.4 %       11.5-14.5                 

 

             Platelet (test code = PLT) 332 k/ul     130-400                   

 

             MPV (test code = MPV) 7.6 fL       7.4-10.4                  



[id number] 17:52:00





             Test Item    Value        Reference Range Interpretation Comments

 

             Glucose (test code 119 mg/dl           H            



             = GLU)                                              

 

             BUN (test code = 17.0 mg/dl   6.0-17.0                  



             BUN)                                                

 

             Creatinine (test 0.9 mg/dl    0.4-1.2                   



             code = CREA)                                        

 

             Sodium (test code = 134 mmol/l   137-145      L            



             NA)                                                 

 

             Potassium (test 4.0 mmol/l   3.5-5.0                   



             code = K)                                           

 

             Chloride (test code 94 mmol/l           L            



             = CL)                                               

 

             CO2 (test code = 28 mmol/l    22-30                     



             CO2)                                                

 

             Calcium (test code 9.1 mg/dl    8.4-10.2                  



             = CALC)                                             

 

             T Protein (test 8.0 gm/dl    5.1-8.7                   



             code = TP)                                          

 

             Albumin (test code 4.1 gm/dl    3.5-4.6                   



             = ALB)                                              

 

             A/G Ratio (test 1.1 %        1.1-2.2                   



             code = AGRAT)                                        

 

             AST (SGOT) (test 28 U/L       11-36                     



             code = AST)                                         

 

             ALT (SGPT) (test 25 U/L       11-40                     



             code = ALT)                                         

 

             Alkaline Phos (test 101 U/L                          



             code = ALKP)                                        

 

             Total Bilirubin 0.3 mg/dl    0.2-1.2                   



             (test code = TBIL)                                        

 

             Globulin (test code 3.9 gm/dl    2.3-3.5      H            



             = GLOBU)                                            

 

             Anion Gap (test 11                                     



             code = GAP)                                         

 

             Calcium, Corrected 9.0 mg/dl    8.4-10.2                  Various f

ormulas exist



             (test code =                                        for corrected s

nette



             CALCCORR)                                           calcium results

, each



                                                                 yielding differ

ent



                                                                 values.  This



                                                                 corrected resul

t was



                                                                 based on the fo

rmula:



                                                                 Corrected Calci

um =



                                                                 SerumCalcium + 

[0.8 *



                                                                 ( 4 - SerumAlbu

min)]

 

             EGFR if  >60                                    



             American (test code mL/min/1.73m\                           



             = EGFRAA)    S\2                                    

 

             EGFR if Non- >60                                    Estimate

d Glomerular



             American (test code mL/min/1.73m\                           Filtrat

ion Rate (eGFR)



             = EGFRNA)    S\2                                    Reference Inter

vals



                                                                 Decision Points

 for 18



                                                                 years and older

 and



                                                                 average body ma

ss:



                                                                  >=  60



                                                                    Does not exc

lude



                                                                 kidney disease.

 30 -



                                                                 59



                                                                 Suggests modera

te



                                                                 chronic kidney 

disease



                                                                 and



                                                                         indicat

es the



                                                                 need for furthe

r



                                                                 investigation



                                                                 



                                                                 including asses

sment



                                                                 of proteinuria 

and



                                                                 



                                                                 



                                                                 cardiovascular



                                                                 factors. < 30



                                                                      Usually in

dicates



                                                                 a need for refe

rral



                                                                 for assessment



                                                                                

    and



                                                                 management of c

hronic



                                                                 kidney failure.



PT AND [id number] 17:36:00





             Test Item    Value        Reference Range Interpretation Comments

 

             Protime (test code 10.0 seconds 9.0-11.8                  



             = PT)                                               

 

             INR (test code = 1.0          0.9-1.1                   INR results

 are



             INR)                                                intended ONLY t

o



                                                                 monitor Oral



                                                                 Anticoagulant t

herapy



                                                                 in stablized pa

tients.



                                                                 The INR Therape

utic



                                                                 Range is 2.0 - 

3.0



                                                                 Patients with a



                                                                 mechanical hear

t, the



                                                                 INR Range is 2.

5 - 3.5



[id number] 17:36:00





             Test Item    Value        Reference Range Interpretation Comments

 

             aPTT (test code = PTT) 26.6 seconds 25.3-35.7                 



URINALYSIS WITH [id number] 17:09:00





             Test Item    Value        Reference Range Interpretation Comments

 

             Color (test code = UCOLR) Lt. Yellow                             

 

             Clarity (test code = UCLAR) SL CLOUDY                              

 

             Glucose (test code = UGLUC) NEGATIVE     NEGATIVE     N            

 

             Bilirubin (test code = UBILI) NEGATIVE     NEGATIVE     N          

  

 

             Ketones (test code = UKET) TRACE        NEGATIVE     A            

 

             Specific Gravity (test code = 1.010        1.005-1.030  A          

  



             USPGR)                                              

 

             Blood (test code = UBLD) MODERATE     NEGATIVE     A            

 

             PH (test code = UPH) 6.5          4.5-8.0      A            

 

             Protein (test code = UPROT) NEGATIVE     NEGATIVE     N            

 

             Urobilinogen (test code = U UROB) 0.2          >0.2         N      

      

 

             Nitrite (test code = UNITR) NEGATIVE     NEGATIVE     N            

 

             Leukocyte Esterase (test code = LARGE        NEGATIVE     A        

    



             ULEUK)                                              

 

             WBC (test code = WBCUR) 10-20        0-5          A            

 

             RBC (test code = RBCUR) 3-5          0-5          A            

 

             Epithial Cells (test code = U EPI) TNTC         0-10         A     

       

 

             Mucous (test code = UMUC) None Seen    None Seen    N            

 

             Bacteria (test code = UBACT) 1+           None Seen,Trace A        

    



CBC (HEMOGRAM ONLY)2017 16:56:00





             Test Item    Value        Reference Range Interpretation Comments

 

             WBC (test code = WBC) 8.2 k/ul     4.8-10.8                  

 

             RBC (test code = RBC) 4.29 Millions/ul 4.20-5.40                 

 

             Hemoglobin (test code = HGB) 13.2 gm/dl   12.0-14.0                

 

 

             Hematocrit (test code = HCT) 39.6 %       37.0-47.0                

 

 

             MCV (test code = MCV) 92.3 fL      81.0-99.0                 

 

             MCH (test code = MCH) 30.8 pg      27.0-31.0                 

 

             MCHC (test code = MCHC) 33.4 gm/dl   33.0-37.0                 

 

             RDW (test code = RDWVC) 14.3 %       11.5-14.5                 

 

             Platelet (test code = PLT) 427 k/ul     130-400      H            

 

             MPV (test code = MPV) 7.5 fL       7.4-10.4